# Patient Record
(demographics unavailable — no encounter records)

---

## 2020-04-23 NOTE — RAD
FRONTAL RADIOGRAPH CHEST:

4/23/20

 

COMPARISON: 

4/16/20.

 

HISTORY: 

Shortness of breath, positive COVID test. 

 

FINDINGS: 

Cardiac silhouette is enlarged. There is a stable single lead IACD inserted via left subclavian appro
ach. New interstitial and alveolar opacity noted in the perihilar regions, left greater than right an
d both lung bases. There is a new hazy area of ground glass opacity within the mid right lung zone as
 well. No pneumothorax. 

 

IMPRESSION: 

New perihilar interstitial opacity/ground glass opacity, left greater than right, extending into bila
teral lung bases. These findings are consistent with the patient's history of COVID-19 infection. Pul
monary edema could have a similar appearance in the proper clinical setting. 

 

POS: MARIA E

## 2020-04-24 NOTE — PDOC.HOSPP
- Subjective


Encounter Date: 04/24/20


Subjective: 





Says he feels ok.  Denies problems breathing.  No cough. 





- Objective


Vital Signs & Weight: 


 Vital Signs (12 hours)











  Temp Pulse Resp BP BP Pulse Ox


 


 04/24/20 14:45  96.5 F L  60  16   142/66 H  96


 


 04/24/20 11:47  97.9 F  60  16   144/65 H  98


 


 04/24/20 10:29       93 L


 


 04/24/20 09:15     143/64 H  


 


 04/24/20 09:13   60    


 


 04/24/20 08:00  96.6 F L  60  16   143/64 H  99


 


 04/24/20 04:00  97.7 F  61  22 H   157/70 H  93 L








 Weight











Weight                         139 lb 8 oz














I&O: 


 











 04/23/20 04/24/20 04/25/20





 06:59 06:59 06:59


 


Intake Total  350 


 


Output Total  400 350


 


Balance  -50 -350











Result Diagrams: 


 04/24/20 04:55





 04/24/20 04:55


Additional Labs: 


 Accuchecks











  04/24/20 04/24/20





  11:16 06:42


 


POC Glucose  195 H  174 H














Hospitalist ROS





- Medication


Medications: 


Active Medications











Generic Name Dose Route Start Last Admin





  Trade Name Freq  PRN Reason Stop Dose Admin


 


Acetaminophen  650 mg  04/23/20 22:13  04/24/20 14:28





  Tylenol  PO   650 mg





  Q4H PRN   Administration





  Headache/Fever/Mild Pain (1-3)   





     





     





     


 


Allopurinol  300 mg  04/24/20 09:00  04/24/20 09:14





  Zyloprim  PO   300 mg





  DAILY JEREL   Administration





     





     





     





     


 


Alogliptin Benzoate  25 mg  04/24/20 09:00  04/24/20 09:14





  Alogliptin  PO   25 mg





  DAILY JEREL   Administration





     





     





     





     


 


Aspirin  81 mg  04/24/20 09:00  04/24/20 09:14





  Ecotrin  PO   81 mg





  DAILY JEREL   Administration





     





     





     





     


 


Carvedilol  25 mg  04/24/20 09:00  04/24/20 09:14





  Coreg  PO   25 mg





  BID JEREL   Administration





     





     





     





     


 


Cholecalciferol  1,000 units  04/24/20 09:00  04/24/20 09:14





  Vitamin D3  PO   1,000 units





  DAILY JEREL   Administration





     





     





     





     


 


Digoxin  0.25 mg  04/24/20 09:00  04/24/20 09:13





  Lanoxin  PO   0.25 mg





  DAILY JEREL   Administration





     





     





     





     


 


Ferrous Sulfate  325 mg  04/24/20 09:00  04/24/20 09:15





  Feosol  PO   325 mg





  DAILY JEREL   Administration





     





     





     





     


 


Furosemide  40 mg  04/24/20 14:00  04/24/20 14:06





  Lasix  SLOW IVP   Not Given





  0600,1400 [facility]   





     





     





     





     


 


Ceftriaxone Sodium 1 gm/  100 mls @ 200 mls/hr  04/23/20 23:59  04/24/20 02:25





  Sodium Chloride  IVPB   100 mls





  2359 JEREL   Administration





     





     





     





     


 


Insulin Human Lispro  0 units  04/24/20 02:34  04/24/20 12:06





  Humalog  SC   2 unit





  .MILD SLIDING SCALE PRN   Administration





  Mild Correctional Scale   





     





     





     


 


Lisinopril  20 mg  04/24/20 09:00  04/24/20 09:15





  Zestril  PO   20 mg





  BID JEREL   Administration





     





     





     





     


 


Metformin HCl  1,000 mg  04/24/20 08:00  04/24/20 09:15





  Glucophage  PO   1,000 mg





  QAM-WM JEREL   Administration





     





     





     





     


 


Sodium Chloride  10 ml  04/24/20 09:00  04/24/20 14:08





  Flush - Normal Saline  IVF   10 ml





  Q12HR JEREL   Administration





     





     





     





     














- Exam


General Appearance: NAD, awake alert


Heart: RRR, no murmur, no gallops, no rubs, normal peripheral pulses


Respiratory: no wheezes, no ronchi, normal chest expansion, no tachypnea, 

normal percussion, rales (Minimal scattered)


Gastrointestinal: soft, non-tender, non-distended, normal bowel sounds, no 

palpable masses, no hepatomegaly, no splenomegaly, no bruit


Skin: normal turgor


Neurological: no focal deficits


Musculoskeletal: normal tone


Psychiatric: normal affect, normal behavior, A&O x 3





Hosp A/P


(1) Respiratory failure with hypoxia


Code(s): J96.91 - RESPIRATORY FAILURE, UNSPECIFIED WITH HYPOXIA   Status: Acute

   





(2) Supratherapeutic INR


Code(s): R79.1 - ABNORMAL COAGULATION PROFILE   Status: Acute   





(3) COVID-19


Code(s): U07.1 - COVID-19   Status: Acute   





(4) Community acquired pneumonia


Code(s): J18.9 - PNEUMONIA, UNSPECIFIED ORGANISM   Status: Acute   





(5) Atrial fibrillation


Code(s): I48.91 - UNSPECIFIED ATRIAL FIBRILLATION   Status: Chronic   





(6) Chronic anticoagulation


Code(s): Z79.01 - LONG TERM (CURRENT) USE OF ANTICOAGULANTS   Status: Chronic   





(7) Diabetes type 2, controlled


Code(s): E11.9 - TYPE 2 DIABETES MELLITUS WITHOUT COMPLICATIONS   Status: 

Chronic   





(8) Hypertension


Code(s): I10 - ESSENTIAL (PRIMARY) HYPERTENSION   Status: Chronic   





(9) Cardiomyopathy


Code(s): I42.9 - CARDIOMYOPATHY, UNSPECIFIED   Status: Acute   





(10) Acute on chronic systolic (congestive) heart failure


Code(s): I50.23 - ACUTE ON CHRONIC SYSTOLIC (CONGESTIVE) HEART FAILURE   Status

: Acute   





(11) Debility


Code(s): R53.81 - OTHER MALAISE   Status: Acute   





- Plan





Resp failure:


Asymptomatic now.


Possible Covid, CAP, CHF.


Supplemental oxygen as needed.





Covid:


Did well and was discharged.


Returned feeling weak and SURESH.


May be second phase or may be other dx.


CRP





CAP:


Cover with Rocephin, Doxy.


DC Azithro due to elevated INR on warfarin.





CHF:


Hx of cardiomyopathy with EF 30%.


Elevated BNP.


Oral diuresis.


Continue max dose Coreg and Lisinopril.





Supratx INR:


Did not have it checked after discharge.


? higher since admission due to Azithromycin.


IV Vitamin K. 





Afib:


Good control now.  


Continue Digoxin. 





Gen Debility:


May need some therapy prior to or after discharge. 





HTN:


Primarily systolic.


Continue ACEI, BB.





DM:


Well controlled.

## 2020-04-24 NOTE — EKG
Test Reason : 

Blood Pressure : ***/*** mmHG

Vent. Rate : 061 BPM     Atrial Rate : 064 BPM

   P-R Int : 000 ms          QRS Dur : 204 ms

    QT Int : 428 ms       P-R-T Axes : 000 -71 094 degrees

   QTc Int : 430 ms

 

Electronic ventricular pacemaker

 

Confirmed by MALI KENNY [id number]),  LC MACIAS ([id number]) on 4/24/2020 3:18:59 PM

 

Referred By:             Confirmed By:MALI CABAN

## 2020-04-24 NOTE — PDOC.HHP
Hospitalist HPI





- History of Present Illness


sob


History of Present Illness: 


hisotry is limited most of it was taken from ed notes, patient is poor 

historian does not understand why he is hospital


case of an 78y/o male w pmhx of htn, cad, cva, dm and afib who is covid 19+ who 

comes to hospital brought by son due to increasing respiratory effort. son 

refers that since patient was discharge has been extremely weak having 

difficulty doing activities of daily living that he used to do by himself is 

has become increasingly fatigue and sob for which he was brought to hospital.  





Hospitalist ROS





- Review of Systems


All other systems reviewed; all pertinent +/- noted in HPI/Subj





Hospitalist History





- Past Medical History


Cardiac: reports: CAD, HTN


Pulmonary: reports: CVA/TIA/stroke


Endocrine: reports: Diabetes





- Social History


Alcohol: reports: None


Drugs: reports: none





- Exam


General Appearance: NAD, awake alert


Eye: PERRL, anicteric sclera


ENT: normocephalic atraumatic, no oropharyngeal lesions


Neck: supple, symmetric, no JVD, no thyromegaly


Heart: RRR, no murmur, no gallops, no rubs


Respiratory: CTAB, no wheezes, no rales, no ronchi


Gastrointestinal: soft, non-tender, non-distended, no hepatomegaly


Extremities: no cyanosis, no clubbing


Skin: normal turgor, no lesions, no rashes


Neurological: cranial nerve grossly intact, normal sensation to touch


Musculoskeletal: normal tone, normal strength


Psychiatric: normal affect, normal behavior, A&O x 3





Hospitalist Results





- Labs


Result Diagrams: 


 04/23/20 17:39





 04/23/20 17:39


Lab results: 


 











WBC  13.6 thou/uL (4.8-10.8)  H  04/23/20  17:39    


 


Hgb  12.9 g/dL (14.0-18.0)  L  04/23/20  17:39    


 


Hct  38.1 % (42.0-52.0)  L  04/23/20  17:39    


 


MCV  101.0 fL (78.0-98.0)  H  04/23/20  17:39    


 


Plt Count  232 thou/uL (130-400)   04/23/20  17:39    


 


Neutrophils %  88.7 % (42.0-75.0)  H  04/23/20  17:39    


 


VBG pCO2  29.7 mmHg (40.0-50.0)  L  04/23/20  17:58    


 


VBG pO2  57.0 mmHg (35.0-45.0)  H  04/23/20  17:58    


 


Sodium  142 mmol/L (136-145)   04/23/20  17:39    


 


Potassium  4.5 mmol/L (3.5-5.1)   04/23/20  17:39    


 


Chloride  113 mmol/L ()  H  04/23/20  17:39    


 


Carbon Dioxide  21 mmol/L (23-31)  L  04/23/20  17:39    


 


BUN  40 mg/dL (8.4-25.7)  H  04/23/20  17:39    


 


Creatinine  1.25 mg/dL (0.7-1.3)   04/23/20  17:39    


 


Glucose  232 mg/dL ()  H  04/23/20  17:39    


 


Calcium  8.4 mg/dL (7.8-10.44)   04/23/20  17:39    


 


Total Bilirubin  1.0 mg/dL (0.2-1.2)   04/23/20  17:39    


 


AST  25 U/L (5-34)   04/23/20  17:39    


 


ALT  26 U/L (8-55)   04/23/20  17:39    


 


Alkaline Phosphatase  99 U/L ()   04/23/20  17:39    


 


CK-MB (CK-2)  1.2 ng/mL (0-6.6)   04/23/20  17:39    


 


Troponin I  0.056 ng/mL (< 0.028)  H  04/23/20  23:45    


 


B-Natriuretic Peptide  542.9 pg/mL (0-100)  H  04/23/20  17:39    


 


Serum Total Protein  6.0 g/dL (5.8-8.1)   04/23/20  17:39    


 


Albumin  2.6 g/dL (3.4-4.8)  L  04/23/20  17:39    














- Radiology Interpretation


  ** Chest x-ray


Status: image reviewed by me, report reviewed by me (b/l infiltrates opacieties 

consistent w covid pneumonia)





Hospitalist H&P A/P





- Problem


(1) COVID-19


Code(s): U07.1 - COVID-19   Status: Acute   





(2) Respiratory failure with hypoxia


Code(s): J96.91 - RESPIRATORY FAILURE, UNSPECIFIED WITH HYPOXIA   Status: Acute

   





(3) Supratherapeutic INR


Code(s): R79.1 - ABNORMAL COAGULATION PROFILE   Status: Acute   





(4) Atrial fibrillation


Code(s): I48.91 - UNSPECIFIED ATRIAL FIBRILLATION   Status: Chronic   





(5) Diabetes type 2, controlled


Code(s): E11.9 - TYPE 2 DIABETES MELLITUS WITHOUT COMPLICATIONS   Status: 

Chronic   





(6) Gout


Code(s): M10.9 - GOUT, UNSPECIFIED   Status: Chronic   





(7) Hypertension


Code(s): I10 - ESSENTIAL (PRIMARY) HYPERTENSION   Status: Chronic   





- Plan


Plan: 





covid 19 - pt w positive test, discharge recently comes back w worsening 

symptoms, worsening cxr with new infiltrates consistent w covid 19, does 

present with some leukocytosis this time, will start prophylactic rocephih and 

azithromycin, will admitt to telemetry. will provide 02 supplementation





resp failure hypoxic - 02 supplemeantation, adequate saturation w 4L arrived in 

the 80s





supratherapeutic inr - dx w afib in last hosp, started on warfarin. no signs of 

bleeding, will hold medication and f/u inr





dm - accu check and sliding scale





continue home meds for chronic conditons

## 2020-04-25 NOTE — PDOC.CPN
- Subjective


Date: 04/25/20


Time: 11:22


Interval history: 





The pt seen and examined.  No overnight events. Per , he 

denied any cardiac complaints. 





- Objective


Allergies/Adverse Reactions: 


 Allergies











Allergy/AdvReac Type Severity Reaction Status Date / Time


 


naproxen [From Aleve] Allergy   Verified 03/23/20 12:56











Visit Medications: 


 Current Medications





Acetaminophen (Tylenol)  650 mg PO Q4H PRN


   PRN Reason: Headache/Fever/Mild Pain (1-3)


   Last Admin: 04/24/20 14:28 Dose:  650 mg


Acetaminophen (Tylenol)  650 mg [place] Q4H PRN


   PRN Reason: Headache/Fever/Mild Pain (1-3)


Hydrocodone Bitart/Acetaminophen (Norco 5/325)  1 tab PO Q4H PRN


   PRN Reason: Moderate Pain (4-6)


Hydrocodone Bitart/Acetaminophen (Norco 5/325)  2 tab PO Q4H PRN


   PRN Reason: Severe Pain (7-10)


Allopurinol (Zyloprim)  300 mg PO DAILY [facility]


   Last Admin: 04/25/20 08:53 Dose:  300 mg


Alogliptin Benzoate (Alogliptin)  25 mg PO DAILY [facility]


   Last Admin: 04/25/20 08:53 Dose:  25 mg


Aspirin (Ecotrin)  81 mg PO DAILY [facility]


   Last Admin: 04/25/20 08:53 Dose:  81 mg


Atorvastatin Calcium (Lipitor)  10 mg PO HS [facility]


   Last Admin: 04/24/20 20:45 Dose:  10 mg


Carvedilol (Coreg)  25 mg PO BID [facility]


   Last Admin: 04/25/20 08:53 Dose:  25 mg


Cholecalciferol (Vitamin D3)  1,000 units PO DAILY [facility]


   Last Admin: 04/25/20 08:53 Dose:  1,000 units


Dextrose/Water (Dextrose 50%)  25 gm SLOW IVP PRN PRN


   PRN Reason: Hypoglycemia


Digoxin (Lanoxin)  0.25 mg PO DAILY [facility]


   Last Admin: 04/25/20 08:53 Dose:  0.25 mg


Ferrous Sulfate (Feosol)  325 mg PO DAILY [facility]


   Last Admin: 04/25/20 08:53 Dose:  325 mg


Furosemide (Lasix)  20 mg PO DAILY [facility]


   Last Admin: 04/25/20 08:53 Dose:  20 mg


Glucagon (Glucagon)  1 mg IM PRN PRN


   PRN Reason: Hypoglycemia


Ceftriaxone Sodium 1 gm/ (Sodium Chloride)  100 mls @ 200 mls/hr IVPB 2359 [facility]


   Last Admin: 04/25/20 00:13 Dose:  100 mls


Dextrose/Water (D5w)  1,000 mls @ 0 mls/hr IV PRN PRN


   PRN Reason: Hypoglycemia


Insulin Human Lispro (Humalog)  0 units SC .MILD SLIDING SCALE PRN


   PRN Reason: Mild Correctional Scale


   Last Admin: 04/25/20 06:30 Dose:  2 unit


Lisinopril (Zestril)  20 mg PO BID [facility]


   Last Admin: 04/25/20 08:54 Dose:  20 mg


Metformin HCl (Glucophage)  1,000 mg PO QAM-WM [facility]


   Last Admin: 04/25/20 08:53 Dose:  1,000 mg


Miscellaneous Medication (Pharmacy To Dose)  1 each PO PRN PRN


   PRN Reason: Pharmacy to dose


Nitroglycerin (Nitrostat)  0.4 mg SL Q5MIN PRN


   PRN Reason: Chest Pain


Ondansetron HCl (Zofran Odt)  4 mg PO Q6H PRN


   PRN Reason: Nausea/Vomiting


Ondansetron HCl (Zofran)  4 mg IVP Q6H PRN


   PRN Reason: Nausea/Vomiting


Sodium Chloride (Flush - Normal Saline)  10 ml IVF Q12HR [facility]


   Last Admin: 04/25/20 08:53 Dose:  10 ml


Sodium Chloride (Flush - Normal Saline)  10 ml IVF PRN PRN


   PRN Reason: Saline Flush


Warfarin Sodium (Coumadin)  5 mg PO 1700 [facility]








Vital Signs & Weight: 


 Vital Signs











  Temp Pulse Resp BP Pulse Ox


 


 04/25/20 09:00     143/64 H 


 


 04/25/20 08:53   60   


 


 04/25/20 07:40  97.7 F  60  18  122/60  99


 


 04/25/20 07:09      95


 


 04/25/20 04:15  98.2 F  89  24 H  162/69 H  95


 


 04/25/20 04:00      88 L


 


 04/25/20 00:15  96.9 F L  60  16  145/63 H  96








 











Weight                         139 lb 8 oz

















- Physical Exam


General: alert & oriented x3


HEENT: mucus membranes moist


Neck: supple neck


Cardiac: irregularly regular (V paced)


Lungs: decreased breath sounds


Neuro: cranial nerve 2-12 intact





- Labs


Result Diagrams: 


 04/26/20 04:44





 04/26/20 04:44


 Troponin/CKMB











CK-MB (CK-2)  1.2 ng/mL (0-6.6)   04/23/20  17:39    


 


Troponin I  0.056 ng/mL (< 0.028)  H  04/23/20  23:45    














- Telemetry


Sinus rhythms and dysrhythmias: other (V paced)





- Assessment/Plan


Assessment/Plan: 





1. several episodes of NSVTs - The pt was asymptomatic; waiting for AICD 

interrogation record


2. Ischemic CMY with hx of [facility] AICD - waiting for AICD interrogation record


3. Chronic Systolic HF - Last admission, the pt had Echo according to Dr Alvarado' consult. 


4. COVID 19 positive 


5. Permanent Afib - 


6. JOE - stable


7. DM type 2


8. HLD


MAR reviewed





* Dr Anderson's pt





Pt. seen and nohemi. by me. I agree with the A/P by the NP.  Irreg. rhythm. Chest: 

minimal bibasilar rales. Interrogate AICD. gjgloria

## 2020-04-25 NOTE — PDOC.HOSPP
- Subjective


Encounter Date: 04/25/20


Encounter Time: 08:00


Subjective: 





overnight, multiple episodes of nonsustained vtach, asymptomatic, despite 

receiving beta blocker last night. Otherwise no events. This morning, used 

bedside , patient has no complaints and denies chest pain, shoulder 

pain, jaw pain, back pain, shortness of breath, dizziness. 





- Objective


Vital Signs & Weight: 


 Vital Signs (12 hours)











  Temp Pulse Resp BP BP Pulse Ox


 


 04/25/20 07:40  97.7 F  60  18   122/60  99


 


 04/25/20 07:09       95


 


 04/25/20 04:15  98.2 F  89  24 H   162/69 H  95


 


 04/25/20 04:00       88 L


 


 04/25/20 00:15  96.9 F L  60  16   145/63 H  96


 


 04/24/20 20:45     143/64 H  








 Weight











Weight                         139 lb 8 oz














I&O: 


 











 04/24/20 04/25/20 04/26/20





 06:59 06:59 06:59


 


Intake Total 350 565 


 


Output Total 400 940 


 


Balance -50 -375 











Result Diagrams: 


 04/25/20 04:39





 04/25/20 04:39


Additional Labs: 


 Accuchecks











  04/24/20 04/24/20 04/24/20





  21:19 15:42 11:16


 


POC Glucose  209 H  154 H  195 H














  04/24/20





  06:42


 


POC Glucose  174 H














Hospitalist ROS





- Review of Systems


Constitutional: reports: weakness.  denies: fever, chills, sweats


Respiratory: denies: cough, shortness of breath, hemoptysis, SOB with excertion

, pleuritic pain, sputum


Cardiovascular: denies: chest pain, palpitations, orthopnea, edema, light 

headedness


Gastrointestinal: denies: nausea, vomiting, abdominal pain, diarrhea





- Medication


Medications: 


Active Medications











Generic Name Dose Route Start Last Admin





  Trade Name Freq  PRN Reason Stop Dose Admin


 


Acetaminophen  650 mg  04/23/20 22:13  04/24/20 14:28





  Tylenol  PO   650 mg





  Q4H PRN   Administration





  Headache/Fever/Mild Pain (1-3)   





     





     





     


 


Allopurinol  300 mg  04/24/20 09:00  04/24/20 09:14





  Zyloprim  PO   300 mg





  DAILY JEREL   Administration





     





     





     





     


 


Alogliptin Benzoate  25 mg  04/24/20 09:00  04/24/20 09:14





  Alogliptin  PO   25 mg





  DAILY JEREL   Administration





     





     





     





     


 


Aspirin  81 mg  04/24/20 09:00  04/24/20 09:14





  Ecotrin  PO   81 mg





  DAILY JEREL   Administration





     





     





     





     


 


Atorvastatin Calcium  10 mg  04/24/20 21:00  04/24/20 20:45





  Lipitor  PO   10 mg





  HS JEREL   Administration





     





     





     





     


 


Carvedilol  25 mg  04/24/20 09:00  04/24/20 20:45





  Coreg  PO   25 mg





  BID JEREL   Administration





     





     





     





     


 


Cholecalciferol  1,000 units  04/24/20 09:00  04/24/20 09:14





  Vitamin D3  PO   1,000 units





  DAILY JEREL   Administration





     





     





     





     


 


Digoxin  0.25 mg  04/24/20 09:00  04/24/20 09:13





  Lanoxin  PO   0.25 mg





  DAILY JEREL   Administration





     





     





     





     


 


Ferrous Sulfate  325 mg  04/24/20 09:00  04/24/20 09:15





  Feosol  PO   325 mg





  DAILY JEREL   Administration





     





     





     





     


 


Ceftriaxone Sodium 1 gm/  100 mls @ 200 mls/hr  04/23/20 23:59  04/25/20 00:13





  Sodium Chloride  IVPB   100 mls





  2359 JEREL   Administration





     





     





     





     


 


Insulin Human Lispro  0 units  04/24/20 02:34  04/25/20 06:30





  Humalog  SC   2 unit





  .MILD SLIDING SCALE PRN   Administration





  Mild Correctional Scale   





     





     





     


 


Lisinopril  20 mg  04/24/20 09:00  04/24/20 20:45





  Zestril  PO   20 mg





  BID JEREL   Administration





     





     





     





     


 


Metformin HCl  1,000 mg  04/24/20 08:00  04/24/20 09:15





  Glucophage  PO   1,000 mg





  QAM-WM JEREL   Administration





     





     





     





     


 


Sodium Chloride  10 ml  04/24/20 09:00  04/24/20 20:45





  Flush - Normal Saline  IVF   10 ml





  Q12HR JEREL   Administration





     





     





     





     














- Exam


General Appearance: NAD, awake alert


Neck: no JVD


Heart: no murmur, no gallops


Heart - other findings: paced rhythm with episodes of nonsustained vtach on tele


Respiratory: CTAB, no wheezes, no rales, no ronchi


Extremities: no edema


Psychiatric: normal affect, normal behavior, A&O x 3





Hosp A/P





- Plan





#nonsustained Vtach


-verbal communication with cardiology; will give coreg and continue to observe


-based on CXR, AICD is [facility]; conveyed to nurse, will interrogate





#chronic atrial fibrillation


-no on anticoagulation; subtherapeutic INR





-started warfarin, pharmacy to dose


-continue digoxin





#chronic hypernatremia


#prerenal azotemia


-patient midly hypovolemic on exam





-stop lasix 40mg IVP bid; start 20mg PO daily





#COVID +ve


-reportedly positive in another institute based on ED on previous admission; 

not confirmed to my knowledge and no repeat test sent


-CXR c/w covid pneumonia; currently stable and breathing and saturating well on 

3L NC


-no associated symptoms this morning; afebrile HD 2

## 2020-04-26 NOTE — PDOC.HOSPP
- Subjective


Encounter Date: 04/26/20


Encounter Time: 09:00


Subjective: 





no overnight events. This morning, refuses all oral medications. Will 

transition to IV medications if possible until adheres to oral medications





- Objective


Vital Signs & Weight: 


 Vital Signs (12 hours)











  Temp Pulse Resp BP BP Pulse Ox


 


 04/26/20 21:05     171/74 H  


 


 04/26/20 19:23  96.5 F L  64  22 H   171/74 H  93 L


 


 04/26/20 16:00  97.8 F  77  14   129/62  98


 


 04/26/20 12:44  97.9 F  63  14   177/73 H  98


 


 04/26/20 10:42   62    








 Weight











Weight                         139 lb 8 oz














I&O: 


 











 04/25/20 04/26/20 04/27/20





 06:59 06:59 06:59


 


Intake Total 565 1040 600


 


Output Total 940 500 500


 


Balance -375 540 100











Result Diagrams: 


 04/26/20 04:44





 04/26/20 04:44


Additional Labs: 


 Accuchecks











  04/26/20 04/26/20 04/26/20





  15:53 12:24 05:56


 


POC Glucose  180 H  231 H  196 H














Hospitalist ROS





- Review of Systems


Constitutional: denies: fever, chills, sweats, weakness, malaise, other


Respiratory: denies: cough, dry, shortness of breath, hemoptysis, SOB with 

excertion, pleuritic pain, sputum, wheezing, other


Cardiovascular: denies: chest pain, palpitations, orthopnea, paroxysmal noc. 

dyspnea, edema, light headedness, other


Gastrointestinal: denies: nausea, vomiting, abdominal pain, diarrhea, 

constipation, melena, hematochezia, other





- Medication


Medications: 


Active Medications











Generic Name Dose Route Start Last Admin





  Trade Name Freq  PRN Reason Stop Dose Admin


 


Acetaminophen  650 mg  04/23/20 22:13  04/26/20 08:14





  Tylenol  PO   650 mg





  Q4H PRN   Administration





  Headache/Fever/Mild Pain (1-3)   





     





     





     


 


Allopurinol  300 mg  04/24/20 09:00  04/26/20 10:41





  Zyloprim  PO   Not Given





  DAILY [facility]   





     





     





     





     


 


Alogliptin Benzoate  25 mg  04/24/20 09:00  04/26/20 10:41





  Alogliptin  PO   Not Given





  DAILY [facility]   





     





     





     





     


 


Aspirin  81 mg  04/24/20 09:00  04/26/20 10:41





  Ecotrin  PO   Not Given





  DAILY [facility]   





     





     





     





     


 


Atorvastatin Calcium  10 mg  04/24/20 21:00  04/26/20 21:06





  Lipitor  PO   10 mg





  HS [facility]   Administration





     





     





     





     


 


Carvedilol  25 mg  04/24/20 09:00  04/26/20 21:05





  Coreg  PO   25 mg





  BID [facility]   Administration





     





     





     





     


 


Cholecalciferol  1,000 units  04/24/20 09:00  04/26/20 10:42





  Vitamin D3  PO   Not Given





  DAILY [facility]   





     





     





     





     


 


Digoxin  0.25 mg  04/24/20 09:00  04/26/20 10:42





  Lanoxin  PO   Not Given





  DAILY [facility]   





     





     





     





     


 


Enoxaparin Sodium  60 mg  04/26/20 21:00  04/26/20 21:39





  Lovenox  SC   Not Given





  0900,2100 [facility]   





     





     





     





     


 


Ferrous Sulfate  325 mg  04/24/20 09:00  04/26/20 10:42





  Feosol  PO   Not Given





  DAILY [facility]   





     





     





     





     


 


Ceftriaxone Sodium 1 gm/  100 mls @ 200 mls/hr  04/23/20 23:59  04/25/20 23:47





  Sodium Chloride  IVPB   100 mls





  2359 [facility]   Administration





     





     





     





     


 


Sodium Chloride  1,000 mls @ 50 mls/hr  04/26/20 17:45  04/26/20 18:21





  1/2 Normal Saline  IV   1,000 mls





  .Q20H [facility]   Administration





     





     





     





     


 


Insulin Human Lispro  0 units  04/24/20 02:34  04/25/20 06:30





  Humalog  SC   2 unit





  .MILD SLIDING SCALE PRN   Administration





  Mild Correctional Scale   





     





     





     


 


Lisinopril  20 mg  04/24/20 09:00  04/26/20 21:05





  Zestril  PO   20 mg





  BID [facility]   Administration





     





     





     





     


 


Metformin HCl  1,000 mg  04/24/20 08:00  04/26/20 10:41





  Glucophage  PO   Not Given





  QAM-WM [facility]   





     





     





     





     


 


Sodium Chloride  10 ml  04/24/20 09:00  04/26/20 10:19





  Flush - Normal Saline  IVF   10 ml





  Q12HR [facility]   Administration





     





     





     





     


 


Warfarin Sodium  5 mg  04/26/20 17:00  04/26/20 17:25





  Coumadin  PO   5 mg





  1700 [facility]   Administration





     





     





     





     














- Exam


General Appearance: NAD, awake alert


Heart: no murmur, no gallops, no rubs, irregular


Respiratory: CTAB, no wheezes, no rales, no ronchi


Gastrointestinal: soft, non-tender, non-distended, normal bowel sounds


Extremities: no edema


Psychiatric: normal affect, normal behavior, A&O x 3





Hosp A/P





- Plan





#nonsustained Vtach


-verbal communication with cardiology; will give coreg and continue to observe


-based on CXR, AICD is [facility]; pacemaker interrogated and cardiology aware





#chronic atrial fibrillation


-no on anticoagulation; subtherapeutic INR





-continue warfarin, pharmacy to dose


-continue digoxin


-if refuses warfarin, administer lovenox





#prerenal JOE


-likely due to aggressive diuresis and recent reduced PO intake





hold lasix 20mg PO qd; if renal function worsens may have to hold lisinopril





#COVID +ve


-reportedly positive in another institute based on ED on previous admission; 

not confirmed to my knowledge and no repeat test sent


-CXR c/w covid pneumonia; currently stable and breathing and saturating well on 

RA


-no associated symptoms this morning; afebrile HD 3

## 2020-04-26 NOTE — PDOC.CPN
- Subjective


Date: 04/26/20


Time: 15:24


Interval history: 





The pt seen and examined.  No overnight events.  No cardiac complaints. 





- Objective


Allergies/Adverse Reactions: 


 Allergies











Allergy/AdvReac Type Severity Reaction Status Date / Time


 


naproxen [From Aleve] Allergy   Verified 03/23/20 12:56











Visit Medications: 


 Current Medications





Acetaminophen (Tylenol)  650 mg PO Q4H PRN


   PRN Reason: Headache/Fever/Mild Pain (1-3)


   Last Admin: 04/26/20 08:14 Dose:  650 mg


Acetaminophen (Tylenol)  650 mg NM Q4H PRN


   PRN Reason: Headache/Fever/Mild Pain (1-3)


Hydrocodone Bitart/Acetaminophen (Norco 5/325)  1 tab PO Q4H PRN


   PRN Reason: Moderate Pain (4-6)


Hydrocodone Bitart/Acetaminophen (Norco 5/325)  2 tab PO Q4H PRN


   PRN Reason: Severe Pain (7-10)


Allopurinol (Zyloprim)  300 mg PO DAILY [facility]


   Last Admin: 04/26/20 10:41 Dose:  Not Given


Alogliptin Benzoate (Alogliptin)  25 mg PO DAILY [facility]


   Last Admin: 04/26/20 10:41 Dose:  Not Given


Aspirin (Ecotrin)  81 mg PO DAILY [facility]


   Last Admin: 04/26/20 10:41 Dose:  Not Given


Atorvastatin Calcium (Lipitor)  10 mg PO HS [facility]


   Last Admin: 04/25/20 20:13 Dose:  10 mg


Carvedilol (Coreg)  25 mg PO BID [facility]


   Last Admin: 04/26/20 10:42 Dose:  Not Given


Cholecalciferol (Vitamin D3)  1,000 units PO DAILY [facility]


   Last Admin: 04/26/20 10:42 Dose:  Not Given


Dextrose/Water (Dextrose 50%)  25 gm SLOW IVP PRN PRN


   PRN Reason: Hypoglycemia


Digoxin (Lanoxin)  0.25 mg PO DAILY [facility]


   Last Admin: 04/26/20 10:42 Dose:  Not Given


Enoxaparin Sodium (Lovenox)  60 mg SC 0900,2100 [facility]


Ferrous Sulfate (Feosol)  325 mg PO DAILY [facility]


   Last Admin: 04/26/20 10:42 Dose:  Not Given


Furosemide (Lasix)  20 mg SLOW IVP DAILY [facility]


Glucagon (Glucagon)  1 mg IM PRN PRN


   PRN Reason: Hypoglycemia


Hydralazine HCl (Apresoline)  10 mg SLOW IVP Q6H PRN


   PRN Reason: Hypertension


Ceftriaxone Sodium 1 gm/ (Sodium Chloride)  100 mls @ 200 mls/hr IVPB 2359 [facility]


   Last Admin: 04/25/20 23:47 Dose:  100 mls


Dextrose/Water (D5w)  1,000 mls @ 0 mls/hr IV PRN PRN


   PRN Reason: Hypoglycemia


Insulin Human Lispro (Humalog)  0 units SC .MILD SLIDING SCALE PRN


   PRN Reason: Mild Correctional Scale


   Last Admin: 04/25/20 06:30 Dose:  2 unit


Labetalol HCl (Normodyne)  10 mg SLOW IVP Q4H PRN


   PRN Reason: SBP GREATER THAN 160


Lisinopril (Zestril)  20 mg PO BID [facility]


   Last Admin: 04/26/20 10:42 Dose:  Not Given


Metformin HCl (Glucophage)  1,000 mg PO QAM-WM [facility]


   Last Admin: 04/26/20 10:41 Dose:  Not Given


Miscellaneous Medication (Pharmacy To Dose)  1 each PO PRN PRN


   PRN Reason: Pharmacy to dose


Nitroglycerin (Nitrostat)  0.4 mg SL Q5MIN PRN


   PRN Reason: Chest Pain


Ondansetron HCl (Zofran Odt)  4 mg PO Q6H PRN


   PRN Reason: Nausea/Vomiting


Ondansetron HCl (Zofran)  4 mg IVP Q6H PRN


   PRN Reason: Nausea/Vomiting


Sodium Chloride (Flush - Normal Saline)  10 ml IVF Q12HR [facility]


   Last Admin: 04/26/20 10:19 Dose:  10 ml


Sodium Chloride (Flush - Normal Saline)  10 ml IVF PRN PRN


   PRN Reason: Saline Flush


Warfarin Sodium (Coumadin)  5 mg PO 1700 [facility]








Vital Signs & Weight: 


 Vital Signs











  Temp Pulse Resp BP Pulse Ox


 


 04/26/20 12:44  97.9 F  63  14  177/73 H  98


 


 04/26/20 10:42   62   


 


 04/26/20 08:11  96.7 F L  62  20  139/62  93 L


 


 04/26/20 06:28  96.6 F L  68  20  132/60  98








 











Weight                         139 lb 8 oz

















- Physical Exam


General: alert & oriented x3


HEENT: mucus membranes moist


Neck: supple neck


Cardiac: other ( V paced)





- Labs


Result Diagrams: 


 04/26/20 04:44





 04/26/20 04:44


 Troponin/CKMB











CK-MB (CK-2)  1.2 ng/mL (0-6.6)   04/23/20  17:39    


 


Troponin I  0.056 ng/mL (< 0.028)  H  04/23/20  23:45    














- Telemetry


Sinus rhythms and dysrhythmias: other (V paced)





- Assessment/Plan


Assessment/Plan: 





1. Several episodes of NSVTs - The pt was asymptomatic; AICD interrogation 

showed no VT/VF since VT/VF detection HR limit was > 170; Per [facility] rep, 

needs ordersfrom EP to decrease the setting; will notify to EP ; On coreg; no 

NSVTs since 04/25/2020


2. Ischemic CMY with hx of [facility] AICD - 


3. Chronic Systolic HF - On coreg, Lisinopril 20mg BID, Lasix; Echo was ordered 

during last admission, but have not done since the pt is COVID 19 positive. 


4. COVID 19 positive 


5. Permanent Afib - Well controlled HR with Coreg, Digoxin, and Lovenox BID


6. JOE - stable


7. DM type 2


8. HLD


MAR reviewed





* Dr Anderson's pt as outpt


* Dr Alvarado's pt as inpt

## 2020-04-27 NOTE — PDOC.HOSPP
- Subjective


Encounter Date: 04/27/20


Encounter Time: 09:00


Subjective: 





no overnight events. This morning, more amenable to receiving PO medications. 

has no complaints. Pending placement





- Objective


Vital Signs & Weight: 


 Vital Signs (12 hours)











  Temp Pulse Resp BP BP Pulse Ox


 


 04/27/20 20:20     171/74 H  


 


 04/27/20 16:50  96.9 F L  75  18   163/72 H  98


 


 04/27/20 12:40  97.0 F L  61  18   149/62 H  96


 


 04/27/20 10:24   68    








 Weight











Weight                         139 lb 8 oz














I&O: 


 











 04/26/20 04/27/20 04/28/20





 06:59 06:59 06:59


 


Intake Total 1040 1250 1000


 


Output Total 500 600 200


 


Balance 540 650 800











Result Diagrams: 


 04/26/20 04:44





 04/26/20 04:44


Additional Labs: 


 Accuchecks











  04/27/20 04/27/20 04/26/20





  16:57 05:45 21:14


 


POC Glucose  175 H  174 H  155 H














Hospitalist ROS





- Review of Systems


Constitutional: denies: fever, chills, sweats, weakness, malaise, other


Respiratory: denies: cough, dry, shortness of breath, hemoptysis, SOB with 

excertion, pleuritic pain, sputum, wheezing, other


Cardiovascular: denies: chest pain, palpitations, orthopnea, paroxysmal noc. 

dyspnea, edema, light headedness, other


Gastrointestinal: denies: nausea, vomiting, abdominal pain, diarrhea, 

constipation, melena, hematochezia, other





- Medication


Medications: 


Active Medications











Generic Name Dose Route Start Last Admin





  Trade Name Kareemq  PRN Reason Stop Dose Admin


 


Acetaminophen  650 mg  04/23/20 22:13  04/26/20 08:14





  Tylenol  PO   650 mg





  Q4H PRN   Administration





  Headache/Fever/Mild Pain (1-3)   





     





     





     


 


Allopurinol  300 mg  04/24/20 09:00  04/27/20 10:25





  Zyloprim  PO   Not Given





  DAILY [facility]   





     





     





     





     


 


Alogliptin Benzoate  25 mg  04/24/20 09:00  04/27/20 10:25





  Alogliptin  PO   Not Given





  DAILY [facility]   





     





     





     





     


 


Aspirin  81 mg  04/24/20 09:00  04/27/20 10:24





  Ecotrin  PO   Not Given





  DAILY [facility]   





     





     





     





     


 


Atorvastatin Calcium  10 mg  04/24/20 21:00  04/27/20 20:20





  Lipitor  PO   10 mg





  HS [facility]   Administration





     





     





     





     


 


Carvedilol  25 mg  04/24/20 09:00  04/27/20 20:19





  Coreg  PO   25 mg





  BID [facility]   Administration





     





     





     





     


 


Cholecalciferol  1,000 units  04/24/20 09:00  04/27/20 10:25





  Vitamin D3  PO   Not Given





  DAILY [facility]   





     





     





     





     


 


Digoxin  0.25 mg  04/24/20 09:00  04/27/20 10:24





  Lanoxin  PO   0.25 mg





  DAILY [facility]   Administration





     





     





     





     


 


Digoxin  0.125 mg  04/27/20 09:00  04/27/20 10:54





  Lanoxin  SLOW IVP   Not Given





  DAILY [facility]   





     





     





     





     


 


Enoxaparin Sodium  60 mg  04/26/20 21:00  04/27/20 20:47





  Lovenox  SC   Not Given





  0900,2100 [facility]   





     





     





     





     


 


Ferrous Sulfate  325 mg  04/24/20 09:00  04/27/20 10:25





  Feosol  PO   Not Given





  DAILY [facility]   





     





     





     





     


 


Ceftriaxone Sodium 1 gm/  100 mls @ 200 mls/hr  04/23/20 23:59  04/27/20 20:47





  Sodium Chloride  IVPB   Not Given





  2359 [facility]   





     





     





     





     


 


Insulin Human Lispro  0 units  04/24/20 02:34  04/25/20 06:30





  Humalog  SC   2 unit





  .MILD SLIDING SCALE PRN   Administration





  Mild Correctional Scale   





     





     





     


 


Lisinopril  20 mg  04/24/20 09:00  04/27/20 20:20





  Zestril  PO   20 mg





  BID [facility]   Administration





     





     





     





     


 


Metformin HCl  1,000 mg  04/24/20 08:00  04/27/20 10:25





  Glucophage  PO   Not Given





  QAM-WM [facility]   





     





     





     





     


 


Sodium Chloride  10 ml  04/24/20 09:00  04/27/20 20:46





  Flush - Normal Saline  IVF   Not Given





  Q12HR [facility]   





     





     





     





     


 


Warfarin Sodium  5 mg  04/26/20 17:00  04/27/20 16:37





  Coumadin  PO   5 mg





  1700 [facility]   Administration





     





     





     





     














- Exam


General Appearance: NAD, awake alert


Heart: no murmur, no gallops, no rubs


Heart - other findings: paced rhythm


Respiratory: CTAB, no wheezes, no rales, no ronchi


Gastrointestinal: soft, non-tender, non-distended, normal bowel sounds


Extremities: no edema


Musculoskeletal: normal tone


Musculoskeletal - other findings: deconditioned


Psychiatric: normal behavior, A&O x 3





Hosp A/P





- Plan





#nonsustained Vtach


-per EP, no indication change AICD settings





#chronic atrial fibrillation


-no on anticoagulation; subtherapeutic INR





-continue warfarin, pharmacy to dose





#prerenal JOE


-repeat BMP





hold lasix 20mg PO qd; if renal function worsens may have to hold lisinopril





#COVID +ve


-patient deconditioned and unable to care for self; CM faciliatating placement 

as per son's request

## 2020-04-28 NOTE — PQF
CLINICAL DOCUMENTATION IMPROVEMENT CLARIFICATION FORM:  ICD-10 Updated

PLEASE DO AN ADDENDUM TO THE PROGRESS NOTE WITH ANY DOCUMENTATION UPDATES OR 
ADDITIONS AND CARRY THROUGH TO DC SUMMARY.   THANK YOU.



DATE:  4/28/2020;  4/29/2020                                           ATTN: 
Dr. Vick



Please exercise your independent, professional judgment in responding to the 
clarification form. 

Clinical indicators are provided on the bottom of this form for your review



Please check appropriate box(s):

Conflicting documentation was noted in the Medical Record, please clarify if 
patient is being treated/monitored for:

[  ]  Acute on chronic systolic (congestive) heart failure

[ x ]  Chronic systolic congestive heart failure

[  ]  Other diagnosis ___________

[  ]  Unable to determine



In addition, please specify:

Present on Admission (POA):  [ x ] Yes             [  ] No             [  ] 
Unable to determine



For continuity of documentation, please document condition throughout progress 
notes and discharge summary.  Thank You.



CLINICAL INDICATORS - SIGNS / SYMPTOMS/ LABS    / RESULTS AND LOCATION IN EMR



H&P 4/23: B-Natriuretic Peptide   542.9



4/24 ([facility]) A/P:  Acute on chronic systolic (congestive) heart failure

                   Plan:   CHF: 

                              Hx of cardiomyopathy with EF 30%



4/26 ([facility]) Chronic Systolic HF 



4/27([facility]) Chronic systolic congestive heart failure, nonischemic cardiomyopathy

                    



RISKS:

H&P 4/23: 78 yo male w pmhx of htn, cad, cva, dm and afib who is covid 19 
positive.

4/26 ([facility]) Ischemic CMY with hx of [facility] AICD. Chronic Systolic HF - Last 
admission





TREATMENT:

4/25 ([facility]) -stop lasix 40 mg IVP bid: start 20 mg PO daily

4/26 ([facility]) On coreg, Lisinopril 20mg BID, Lasix

MAR Order 4/26: Lasix 20 mg slow IVP daily

_____________________________________________________________



Thank you,

Karen

(This form is maintained as a part of the permanent medical record)

2015 [employer]  All Rights Reserved

Karen Varela, RN, BSN    [email or web address]    Cell Phone: [phone]

                                                              

 

[facility]

## 2020-04-28 NOTE — CON
DATE OF CONSULTATION:  04/27/2020



Dictated by Brenda Luis, nurse practitioner, acting as scribe for Dr. Hector Archuleta. 



PHYSICIAN CONSULTED:  Hector Archuleta MD



REASON FOR CONSULTATION:  Nonsustained ventricular tachycardia.



HISTORY OF PRESENT ILLNESS:  Mr. Stoner is a 79-year-old gentleman with a

complicated past medical history including coronary artery disease, atrial

fibrillation, ICD in-situ, and hypertension.  He regularly follows with a

cardiologist in [place], Tadeo Anderson.  He was diagnosed with COVID

earlier this month and he was brought to the emergency room by his son due to

increased respiratory effort.  The patient had recently been discharged, but he is a

very poor historian and does not have a good grasp of why he was at the hospital.

Since being admitted, he has been seen to have nonsustained ventricular tachycardia

on the telemetry monitoring system at approximately 120 to 130 beats per minute.  He

has been asymptomatic with these episodes.  His ICD was interrogated and VT is

falling below the detect rate.  EP consultation was requested for guidance on VT and

ICD programming. 



Mr. Stoner does not voice any significant acute issues.  He does have persisting

weakness along side with fatigue and shortness of breath. 



REVIEW OF SYSTEMS:  A 12-point review of systems is otherwise unremarkable.  The

patient denies any heart racing, palpitations, chest pain, pressure, syncope, near

syncope, stroke, or stroke-like symptoms. 



PAST MEDICAL HISTORY:  Coronary artery disease, hypertension, stroke, diabetes,

atrial fibrillation, and [facility] [facility] [employer] ICD implanted February 2012. 



SOCIAL HISTORY:  Denies alcohol, tobacco, or illicit drug use.



FAMILY HISTORY:  Noncontributory.  The patient is a poor historian.



ALLERGIES:  NAPROXEN.



HOME MEDICATIONS:  Include;

1. Short course of Omnicef.

2. Allopurinol 300 mg daily.

3. Januvia 100 mg daily.

4. Metformin 1000 mg q.a.m.

5. Warfarin as directed.

6. Vitamin D3 of 1000 units daily.

7. Coreg 25 mg b.i.d.

8. Aspirin 81 mg daily.

9. Simvastatin 20 mg daily.

10. Potassium 10 mEq daily.

11. Omeprazole 20 mg daily.

12. Nitroglycerin sublingual as needed.

13. Magnesium glycinate 400 mg p.o. b.i.d.

14. Lasix 20 mg q.a.m.

15. Digoxin 250 mcg daily.

16. Cardizem SR 90 mg b.i.d.

17. Iron 325 mg daily.

18. Lisinopril 20 mg b.i.d.



OBJECTIVE:  VITAL SIGNS:  Temperature 97 degrees, pulse 68, blood pressure 132/65,

respirations 24, and oxygen 94% on room air. 

GENERAL:  The patient is alert and oriented to person, place. Poor historian, poorly

aware of his current situation, but in no apparent distress at the time of exam. 

NECK:  Supple with no jugular venous distention.  There is no lymphadenopathy.  His

trachea is midline. 

LUNGS:  Diminished in the bases with fine bibasilar crackles.  Otherwise,

respirations in the upper lobes are clear.  The patient is in no respiratory

distress. 

HEART:  Rate is irregularly irregular.  No significant murmur, rub, or gallop is

appreciated.  Precordial ICD site is without reaction. 

ABDOMEN:  Soft and nontender without palpable masses.  Positive bowel sounds are

noted throughout. 

EXTREMITIES:  Warm and dry to touch.  Well perfused.  Trace bilateral lower

extremity edema.  No clubbing or cyanosis. 

NEUROLOGIC:  Grossly intact and at baseline.  Gait was not assessed.



LABORATORY DATA:  WBC 11.1, trending down and hemoglobin 12.3.  Chemistries are

unremarkable.  Creatinine 1.38 and magnesium 1.9.  Digoxin levels 1.45. 



DIAGNOSTIC STUDIES:  Telemetry and EKG show atrial fibrillation with RV pacing,

nonsustained VT at approximately 120 beats per minute. 



IMPRESSION:  

1. Slow nonsustained ventricular tachycardia below the implantable

cardioverter-defibrillator detect rate and largely asymptomatic and unaware of these

episodes. 

2. Ejection fraction 30% by echo in March of 2017.

3. Chronic systolic congestive heart failure, nonischemic cardiomyopathy.

4. Chronic atrial fibrillation.

5. Status post single-chamber implantable cardioverter-defibrillator implant in

February 2012 with chronically elevated RV pacing at 88%. 

6. Recent positive COVID with pneumonia improving.

7. Elevated INR with recent dose adjustments.



RECOMMENDATIONS:  For now, we will continue monitoring via his ICD and on telemetry.

 He is asymptomatic with his nonsustained slow VT episodes.  I would hold off on

antiarrhythmic therapy options at this time.  He is in generally frail condition, so

I would recommend routine medical management with continued Coreg.  Seeing as he is

asymptomatic with these episodes and they are nonsustained, I do not see much

benefit in identifying them with ICD.  If he does begin to have a more rapid or

sustained VT, his ICD will appropriately detect that and the device is functioning

normally. 



Thank you for allowing me to assist with the care of this patient.  I do recommend

that he follow up as an outpatient with his cardiologist. 







Job ID:  [id number]

## 2020-04-28 NOTE — PDOC.HOSPP
- Subjective


Encounter Date: 04/28/20


Encounter Time: 10:00


Subjective: 





no overnight events. this morning, feeling well and has no complaints. pending 

placement





- Objective


Vital Signs & Weight: 


 Vital Signs (12 hours)











  Temp Pulse Resp BP BP Pulse Ox


 


 04/28/20 15:53       97


 


 04/28/20 15:52  97.6 F  60  18   132/63  97


 


 04/28/20 09:00  97.4 F L  68  20   126/75  93 L


 


 04/28/20 08:43   60    


 


 04/28/20 08:42     171/74 H  


 


 04/28/20 08:40   60    








 Weight











Weight                         139 lb 8 oz














I&O: 


 











 04/27/20 04/28/20 04/29/20





 06:59 06:59 06:59


 


Intake Total 1250 1240 420


 


Output Total 600 900 425


 


Balance 650 340 -5











Result Diagrams: 


 04/28/20 04:24





 04/28/20 04:24


Additional Labs: 


 Accuchecks











  04/28/20 04/28/20 04/27/20





  15:17 12:11 20:27


 


POC Glucose  192 H  232 H  184 H














Hospitalist ROS





- Review of Systems


Constitutional: denies: fever, chills, sweats, weakness, malaise, other


Respiratory: denies: cough, dry, shortness of breath, hemoptysis, SOB with 

excertion, pleuritic pain, sputum, wheezing, other


Cardiovascular: denies: chest pain, palpitations, orthopnea, paroxysmal noc. 

dyspnea, edema, light headedness, other


Gastrointestinal: denies: nausea, vomiting, abdominal pain, diarrhea, 

constipation, melena, hematochezia, other





- Medication


Medications: 


Active Medications











Generic Name Dose Route Start Last Admin





  Trade Name Freq  PRN Reason Stop Dose Admin


 


Acetaminophen  650 mg  04/23/20 22:13  04/26/20 08:14





  Tylenol  PO   650 mg





  Q4H PRN   Administration





  Headache/Fever/Mild Pain (1-3)   





     





     





     


 


Allopurinol  300 mg  04/24/20 09:00  04/28/20 08:41





  Zyloprim  PO   300 mg





  DAILY JEREL   Administration





     





     





     





     


 


Alogliptin Benzoate  25 mg  04/24/20 09:00  04/28/20 08:41





  Alogliptin  PO   25 mg





  DAILY [facility]   Administration





     





     





     





     


 


Aspirin  81 mg  04/24/20 09:00  04/28/20 08:41





  Ecotrin  PO   81 mg





  DAILY [facility]   Administration





     





     





     





     


 


Atorvastatin Calcium  10 mg  04/24/20 21:00  04/28/20 20:28





  Lipitor  PO   10 mg





  HS [facility]   Administration





     





     





     





     


 


Carvedilol  25 mg  04/24/20 09:00  04/28/20 20:28





  Coreg  PO   25 mg





  BID [facility]   Administration





     





     





     





     


 


Cholecalciferol  1,000 units  04/24/20 09:00  04/28/20 08:41





  Vitamin D3  PO   1,000 units





  DAILY [facility]   Administration





     





     





     





     


 


Digoxin  0.25 mg  04/24/20 09:00  04/28/20 08:40





  Lanoxin  PO   0.25 mg





  DAILY [facility]   Administration





     





     





     





     


 


Digoxin  0.125 mg  04/27/20 09:00  04/28/20 08:43





  Lanoxin  SLOW IVP   Not Given





  DAILY [facility]   





     





     





     





     


 


Enoxaparin Sodium  60 mg  04/26/20 21:00  04/28/20 20:28





  Lovenox  SC   60 mg





  0900,2100 [facility]   Administration





     





     





     





     


 


Ferrous Sulfate  325 mg  04/24/20 09:00  04/28/20 08:41





  Feosol  PO   325 mg





  DAILY [facility]   Administration





     





     





     





     


 


Furosemide  20 mg  04/27/20 09:00  04/28/20 08:42





  Lasix  SLOW IVP   20 mg





  DAILY [facility]   Administration





     





     





     





     


 


Ceftriaxone Sodium 1 gm/  100 mls @ 200 mls/hr  04/23/20 23:59  04/27/20 20:47





  Sodium Chloride  IVPB   Not Given





  2359 [facility]   





     





     





     





     


 


Insulin Human Lispro  0 units  04/24/20 02:34  04/28/20 16:46





  Humalog  SC   2 unit





  .MILD SLIDING SCALE PRN   Administration





  Mild Correctional Scale   





     





     





     


 


Lisinopril  20 mg  04/24/20 09:00  04/28/20 20:27





  Zestril  PO   20 mg





  BID [facility]   Administration





     





     





     





     


 


Metformin HCl  1,000 mg  04/24/20 08:00  04/28/20 08:41





  Glucophage  PO   1,000 mg





  QAM-WM [facility]   Administration





     





     





     





     


 


Sodium Chloride  10 ml  04/24/20 09:00  04/28/20 20:28





  Flush - Normal Saline  IVF   Not Given





  Q12HR [facility]   





     





     





     





     


 


Warfarin Sodium  5 mg  04/26/20 17:00  04/28/20 16:46





  Coumadin  PO   5 mg





  1700 [facility]   Administration





     





     





     





     














- Exam


General Appearance: NAD, awake alert


Heart: RRR, no murmur, no gallops, no rubs, normal peripheral pulses


Respiratory: CTAB, no wheezes, no rales, no ronchi, normal chest expansion, no 

tachypnea, normal percussion


Gastrointestinal: soft, non-tender, non-distended, normal bowel sounds, no 

palpable masses, no hepatomegaly, no splenomegaly, no bruit


Extremities: no edema





Hosp A/P





- Plan





#nonsustained Vtach


-per EP, no indication to change AICD settings





#chronic atrial fibrillation


-nearly at goal





-continue warfarin, low vitamin-k diet, pharmacy to dose





#prerenal JOE


-resolved after supplementing fluids





hold lasix 20mg PO qd





#COVID +ve


-patient deconditioned and unable to care for self; CM faciliatating placement 

as per son's request


-covid +ve (4/28); pending placement in [facility] swing, will accept despite +ve 

covid





expected DC 4/29

## 2020-04-28 NOTE — PDOC.EP
- Subjective


Date: 04/28/20


Time: 08:00


Interval History: 





follow up for ICD and NSVT management. Patient feels fair.  Pending placement. 

Voices no complaints. 





- Review of Systems


Constitutional: denies: chills, fever, malaise, sweats, weakness, other


Respiratory: denies: cough, dry, hemoptysis, pleuritic pain, shortness of breath

, SOB with excertion, sputum, wheezing, other


Cardiology: denies: chest pain, light headedness, passing out





- Objective


Allergies/Adverse Reactions: 


 Allergies











Allergy/AdvReac Type Severity Reaction Status Date / Time


 


naproxen [From Aleve] Allergy   Verified 03/23/20 12:56











 Current Medications





Acetaminophen (Tylenol)  650 mg PO Q4H PRN


   PRN Reason: Headache/Fever/Mild Pain (1-3)


   Last Admin: 04/26/20 08:14 Dose:  650 mg


Acetaminophen (Tylenol)  650 mg OK Q4H PRN


   PRN Reason: Headache/Fever/Mild Pain (1-3)


Hydrocodone Bitart/Acetaminophen (Norco 5/325)  1 tab PO Q4H PRN


   PRN Reason: Moderate Pain (4-6)


Hydrocodone Bitart/Acetaminophen (Norco 5/325)  2 tab PO Q4H PRN


   PRN Reason: Severe Pain (7-10)


Allopurinol (Zyloprim)  300 mg PO DAILY [facility]


   Last Admin: 04/28/20 08:41 Dose:  300 mg


Alogliptin Benzoate (Alogliptin)  25 mg PO DAILY [facility]


   Last Admin: 04/28/20 08:41 Dose:  25 mg


Aspirin (Ecotrin)  81 mg PO DAILY [facility]


   Last Admin: 04/28/20 08:41 Dose:  81 mg


Atorvastatin Calcium (Lipitor)  10 mg PO HS [facility]


   Last Admin: 04/27/20 20:20 Dose:  10 mg


Carvedilol (Coreg)  25 mg PO BID [facility]


   Last Admin: 04/28/20 08:41 Dose:  25 mg


Cholecalciferol (Vitamin D3)  1,000 units PO DAILY [facility]


   Last Admin: 04/28/20 08:41 Dose:  1,000 units


Dextrose/Water (Dextrose 50%)  25 gm SLOW IVP PRN PRN


   PRN Reason: Hypoglycemia


Digoxin (Lanoxin)  0.25 mg PO DAILY [facility]


   Last Admin: 04/28/20 08:40 Dose:  0.25 mg


Digoxin (Lanoxin)  0.125 mg SLOW IVP DAILY [facility]


   Last Admin: 04/28/20 08:43 Dose:  Not Given


Enoxaparin Sodium (Lovenox)  60 mg SC 0900,2100 [facility]


   Last Admin: 04/28/20 08:42 Dose:  60 mg


Ferrous Sulfate (Feosol)  325 mg PO DAILY [facility]


   Last Admin: 04/28/20 08:41 Dose:  325 mg


Furosemide (Lasix)  20 mg SLOW IVP DAILY [facility]


   Last Admin: 04/28/20 08:42 Dose:  20 mg


Glucagon (Glucagon)  1 mg IM PRN PRN


   PRN Reason: Hypoglycemia


Hydralazine HCl (Apresoline)  10 mg SLOW IVP Q6H PRN


   PRN Reason: Hypertension BP>180/110


Ceftriaxone Sodium 1 gm/ (Sodium Chloride)  100 mls @ 200 mls/hr IVPB 2359 [facility]


   Last Admin: 04/27/20 20:47 Dose:  Not Given


Dextrose/Water (D5w)  1,000 mls @ 0 mls/hr IV PRN PRN


   PRN Reason: Hypoglycemia


Insulin Human Lispro (Humalog)  0 units SC .MILD SLIDING SCALE PRN


   PRN Reason: Mild Correctional Scale


   Last Admin: 04/25/20 06:30 Dose:  2 unit


Labetalol HCl (Normodyne)  10 mg SLOW IVP Q4H PRN


   PRN Reason: SBP GREATER THAN 160


Lisinopril (Zestril)  20 mg PO BID [facility]


   Last Admin: 04/28/20 08:42 Dose:  20 mg


Metformin HCl (Glucophage)  1,000 mg PO QAM-WM [facility]


   Last Admin: 04/28/20 08:41 Dose:  1,000 mg


Miscellaneous Medication (Pharmacy To Dose)  1 each PO PRN PRN


   PRN Reason: Pharmacy to dose


Nitroglycerin (Nitrostat)  0.4 mg SL Q5MIN PRN


   PRN Reason: Chest Pain


Ondansetron HCl (Zofran Odt)  4 mg PO Q6H PRN


   PRN Reason: Nausea/Vomiting


Ondansetron HCl (Zofran)  4 mg IVP Q6H PRN


   PRN Reason: Nausea/Vomiting


Sodium Chloride (Flush - Normal Saline)  10 ml IVF Q12HR [facility]


   Last Admin: 04/28/20 08:43 Dose:  10 ml


Sodium Chloride (Flush - Normal Saline)  10 ml IVF PRN PRN


   PRN Reason: Saline Flush


Warfarin Sodium (Coumadin)  5 mg PO 1700 [facility]


   Last Admin: 04/27/20 16:37 Dose:  5 mg








Vital Signs & Weight: 


 Vital Signs











  Temp Pulse Resp BP BP Pulse Ox


 


 04/28/20 08:43   60    


 


 04/28/20 08:42     171/74 H  


 


 04/28/20 08:40   60    


 


 04/28/20 04:12  97.4 F L  60  16   147/64 H  95


 


 04/28/20 00:24       95








 











Weight                         139 lb 8 oz














I/O: 


 I/O











 04/27/20 04/28/20 04/29/20





 06:59 06:59 06:59


 


Intake Total 1250 1240 


 


Output Total 600 900 


 


Balance 650 340 














- Quality Measures


Condition: Atrial Fibrillation/Flutter (hx or current)


CV meds: Warfarin: Yes





- Physical Exam


General: appears well, no apparent distress, speech clear, affect appropriate


HEENT: mucus membranes moist, normocephaly


Neck: supple neck, no JVD/HJR, no lymphadenopathy


Cardiology: no murmur, regular rate, PMI nondisplaced, irregularly irregular


Lungs: clear to auscultation, no wheeze, rales, rhonchi


Neurology: cranial nerve 2-12 intact, grossly intact, no lateralizing findings


Skin: device site stable w/o swelling





- Labs


Result Diagrams: 


 04/28/20 04:24





 04/28/20 04:24





- EKG Interpretation


EKG Method: Telemetry


EKG shows: Atrial fibrillation





- Device


Device: single, defibrillator


Device Result: [facility]





- Assessment/Plan


Assessment/Plan: 





1. Slow nonsustained ventricular tachycardia below the implantable


cardioverter-defibrillator detect rate and largely asymptomatic and unaware of 

these


episodes. 


2. Ejection fraction 30% by echo in March of 2017.


3. Chronic systolic congestive heart failure, nonischemic cardiomyopathy.


4. Chronic atrial fibrillation.


5. Status post single-chamber implantable cardioverter-defibrillator implant in


February 2012 with chronically elevated RV pacing at 88%. 


6. Recent positive COVID with pneumonia improving.


7. Elevated INR with recent dose adjustments.








Continues to have slow NSVT, ~100bpm, of which Mr Stoner in asymptomatic and 

unaware.Continue beta blocker therapy. He has high grade RV pacing but this is 

reported on his device for years with no declining EF. No changes from EP plan.

## 2020-04-29 NOTE — PQF
CLINICAL DOCUMENTATION IMPROVEMENT CLARIFICATION FORM:  ICD-10 Updated

PLEASE DO AN ADDENDUM TO THE PROGRESS NOTE WITH ANY DOCUMENTATION UPDATES OR 
ADDITIONS AND CARRY THROUGH TO DC SUMMARY.   THANK YOU.



DATE:      4/29/2020                                   ATTN:  Dr. Vick



Please exercise your independent, professional judgment in responding to the 
clarification form. 

Clinical indicators are provided on the bottom of this form for your review



Please check appropriate box(es):

[  ] Sepsis present on admission    [  ] Sepsis NOT present on admission    [  
] Unable to determine

             Due to: _______________

[  ] Severe sepsis present on admission     [  ] Severe Sepsis NOT present on 
admission   [  ] Unable to determine

                    with acute organ dysfunction of: ___________________

[ x ] Localized infection without sepsis

[  ] Other diagnosis ___________

[  ] Unable to determine



For continuity of documentation, please document condition throughout progress 
notes and discharge summary.  Thank You.



CLINICAL INDICATORS - SIGNS / SYMPTOMS / LABS   / RESULTS AND LOCATION IN 



ED Record 4/23  VS: /53, Pulse 61, Resp. 39, Temp. 97.4.  O2 sat 96 on 4L 
Oxygen



H&P 4/23: Lab: WBC 13.6

                       Neutrophils   88.7 %

                       COVID-19

                       Respiratory failure with hypoxia 



LAB: 4/25  CRP  11.82



RISK:

H&P 4/23: 78 yo male w pmhx of htn, cad, cva, dm and afib who is covid 19 
positive.

COVID-19. Respiratory failure with hypoxia.

4/27 (Nava) prerenal JOE





TREATMENT:

H&P 4/23: will start prophylactic rocephin and azithromycin. 

Order 4/23: Resp: O2 to keep sats 94% continuous

___________________________________________________________





Thank you,

Karen

(This form is maintained as a part of the permanent medical record)

 2015 [email or web address].  All Rights Reserved

Karen Varela RN, BSN    [email or web address]    Cell Phone: [phone]

                                                              

[facility]

## 2020-04-29 NOTE — PDOC.EP
- Subjective


Date: 04/29/20


Time: 13:09


Interval History: 





follow up for slow NSVT and ICD management. 





- Review of Systems


ROS unobtainable: due to mental status





- Objective


Allergies/Adverse Reactions: 


 Allergies











Allergy/AdvReac Type Severity Reaction Status Date / Time


 


naproxen [From Aleve] Allergy   Verified 03/23/20 12:56











 Current Medications





Acetaminophen (Tylenol)  650 mg PO Q4H PRN


   PRN Reason: Headache/Fever/Mild Pain (1-3)


   Last Admin: 04/26/20 08:14 Dose:  650 mg


Acetaminophen (Tylenol)  650 mg [place] Q4H PRN


   PRN Reason: Headache/Fever/Mild Pain (1-3)


Hydrocodone Bitart/Acetaminophen (Norco 5/325)  1 tab PO Q4H PRN


   PRN Reason: Moderate Pain (4-6)


Hydrocodone Bitart/Acetaminophen (Norco 5/325)  2 tab PO Q4H PRN


   PRN Reason: Severe Pain (7-10)


Allopurinol (Zyloprim)  300 mg PO DAILY [facility]


   Last Admin: 04/29/20 08:07 Dose:  300 mg


Alogliptin Benzoate (Alogliptin)  25 mg PO DAILY [facility]


   Last Admin: 04/29/20 08:36 Dose:  25 mg


Aspirin (Ecotrin)  81 mg PO DAILY [facility]


   Last Admin: 04/29/20 08:07 Dose:  81 mg


Atorvastatin Calcium (Lipitor)  10 mg PO HS [facility]


   Last Admin: 04/28/20 20:28 Dose:  10 mg


Carvedilol (Coreg)  25 mg PO BID [facility]


   Last Admin: 04/29/20 08:07 Dose:  25 mg


Cholecalciferol (Vitamin D3)  1,000 units PO DAILY [facility]


   Last Admin: 04/29/20 08:07 Dose:  1,000 units


Dextrose/Water (Dextrose 50%)  25 gm SLOW IVP PRN PRN


   PRN Reason: Hypoglycemia


Digoxin (Lanoxin)  0.25 mg PO DAILY [facility]


   Last Admin: 04/29/20 08:33 Dose:  0.25 mg


Digoxin (Lanoxin)  0.125 mg SLOW IVP DAILY [facility]


   Last Admin: 04/29/20 08:34 Dose:  Not Given


Enoxaparin Sodium (Lovenox)  60 mg SC 0900,2100 [facility]


   Last Admin: 04/29/20 08:07 Dose:  60 mg


Ferrous Sulfate (Feosol)  325 mg PO DAILY [facility]


   Last Admin: 04/29/20 08:07 Dose:  325 mg


Furosemide (Lasix)  20 mg SLOW IVP DAILY [facility]


   Last Admin: 04/29/20 08:08 Dose:  20 mg


Glucagon (Glucagon)  1 mg IM PRN PRN


   PRN Reason: Hypoglycemia


Hydralazine HCl (Apresoline)  10 mg SLOW IVP Q6H PRN


   PRN Reason: Hypertension BP>180/110


Ceftriaxone Sodium 1 gm/ (Sodium Chloride)  100 mls @ 200 mls/hr IVPB 2359 [facility]


   Last Admin: 04/28/20 21:36 Dose:  Not Given


Dextrose/Water (D5w)  1,000 mls @ 0 mls/hr IV PRN PRN


   PRN Reason: Hypoglycemia


Insulin Human Lispro (Humalog)  0 units SC .MILD SLIDING SCALE PRN


   PRN Reason: Mild Correctional Scale


   Last Admin: 04/29/20 12:00 Dose:  2 unit


Labetalol HCl (Normodyne)  10 mg SLOW IVP Q4H PRN


   PRN Reason: SBP GREATER THAN 160


Lisinopril (Zestril)  20 mg PO BID [facility]


   Last Admin: 04/29/20 08:35 Dose:  Not Given


Metformin HCl (Glucophage)  1,000 mg PO QAM-WM [facility]


   Last Admin: 04/29/20 08:06 Dose:  1,000 mg


Miscellaneous Medication (Pharmacy To Dose)  1 each PO PRN PRN


   PRN Reason: Pharmacy to dose


Nitroglycerin (Nitrostat)  0.4 mg SL Q5MIN PRN


   PRN Reason: Chest Pain


Ondansetron HCl (Zofran Odt)  4 mg PO Q6H PRN


   PRN Reason: Nausea/Vomiting


Ondansetron HCl (Zofran)  4 mg IVP Q6H PRN


   PRN Reason: Nausea/Vomiting


Sodium Chloride (Flush - Normal Saline)  10 ml IVF Q12HR [facility]


   Last Admin: 04/29/20 08:35 Dose:  Not Given


Sodium Chloride (Flush - Normal Saline)  10 ml IVF PRN PRN


   PRN Reason: Saline Flush


Warfarin Sodium (Coumadin)  5 mg PO 1700 [facility]


   Last Admin: 04/28/20 16:46 Dose:  5 mg








Vital Signs & Weight: 


 Vital Signs











  Temp Pulse Resp BP BP Pulse Ox


 


 04/29/20 12:00  97.8 F  62  16   135/90  92 L


 


 04/29/20 08:35     135/58 L  


 


 04/29/20 08:34   60    


 


 04/29/20 08:33   60    


 


 04/29/20 08:15  98.5 F  60  16   135/58 L 


 


 04/29/20 08:00       95


 


 04/29/20 03:18  97.6 F  60  18   130/63  95








 











Weight                         139 lb 8 oz














I/O: 


 I/O











 04/28/20 04/29/20 04/30/20





 06:59 06:59 06:59


 


Intake Total 1240 660 


 


Output Total 900 425 


 


Balance 340 235 














- Quality Measures


Condition: Atrial Fibrillation/Flutter (hx or current)


CV meds: Warfarin: Yes





- Physical Exam


General: no apparent distress.  negative: alert & oriented x3


HEENT: mucus membranes moist, normocephaly.  negative: oral lesions


Neck: supple neck, no JVD/HJR, no lymphadenopathy


Cardiology: regular rate and rhythm, PMI nondisplaced


Lungs: clear to auscultation, no wheeze, rales, rhonchi, decreased breath sounds





- Labs


Result Diagrams: 


 04/28/20 04:24





 04/28/20 04:24





- EKG Interpretation


EKG Method: Telemetry


EKG shows: Sinus rhythm





- Assessment/Plan


Assessment/Plan: 








1. Slow nonsustained ventricular tachycardia below the implantable


cardioverter-defibrillator detect rate and largely asymptomatic and unaware of 

these


episodes. 


2. Ejection fraction 30% by echo in March of 2017.


3. Chronic systolic congestive heart failure, nonischemic cardiomyopathy.


4. Chronic atrial fibrillation.


5. Status post single-chamber implantable cardioverter-defibrillator implant in


February 2012 with chronically elevated RV pacing at 88%. 


6. Recent positive COVID with pneumonia improving.


7. Elevated INR with recent dose adjustments.








Had a single 5 beat run of NSVT, ~100bpm, of which he is asymptomatic and 

unaware. Continue beta blocker therapy. He has high grade RV pacing but this is 

reported on his device for years with no declining EF. No changes from EP plan.

  OK for DC. Signing off

## 2020-04-30 NOTE — DIS
DATE OF ADMISSION:  04/23/2020



DATE OF DISCHARGE:  04/29/2020



HOSPITAL COURSE:  Mr. Stoner is a 79-year-old male who presented with dyspnea 
after

being diagnosed with COVID pneumonia.  The patient was discharged recently, but 
has

had progressively worsening weakness and increased respiratory effort, so the 
son

brought him back to the ED and he was admitted for treatment of COVID.  During 
his

inpatient stay, he was also treated for prerenal JOE that has resolved chronic

atrial fibrillation, which required adjustment of his warfarin dosage and

nonsustained ventricular tachycardia for which Cardiology was consulted, AICD 
was

interrogated, and there was no indication per EP Cardiology to change AICD

settings. 



PHYSICAL EXAMINATION:

VITAL SIGNS:  Blood pressure 145/66, pulse 64, respiratory rate 14, oxygen

saturation 92% on room air, and temperature 98.9. 

GENERAL APPEARANCE:  No apparent distress.  Alert and oriented. 

HEART:  Regular rate and rhythm.  No murmur.  No gallops.  No rubs.  Normal

peripheral pulses. 

RESPIRATORY:  Clear to auscultation bilaterally.  No wheezes, no rales, no 
rhonchi.

Normal chest expansion.  No tachypnea. 

GI:  Soft, nontender, nondistended.  Normal bowel sounds. 

EXTREMITIES:  No edema. 



The patient was discharged to a swing bed at [place].



DISCHARGE MEDICATIONS:  New medications:

1. Atorvastatin 40 mg daily. 

Continued medications:

1. Allopurinol.

2. Digoxin.

3. Ferrous sulfate.

4. Omeprazole.

5. Metformin.

6. Lasix.

7. Sitagliptin.

8. Lisinopril.

9. Carvedilol.

10. Warfarin.

11. Magnesium oxide.

12. Cholecalciferol.

13. Aspirin.

14. Nitroglycerin. 

Discontinued medications:

1. Simvastatin.

2. Cefdinir.

3. Diltiazem.







Job ID:  [id number]



MTDD

## 2020-05-01 NOTE — PQF
LUIS ROMO PAMELA AKERS JESUS

[id number]                                                             [facility]

[id number]                             

                                   

CLINICAL DOCUMENTATION CLARIFICATION FORM:  POST DISCHARGE



Addendum to original discharge summary date:  __________________________________
____



Late entry note date:  _________________________________________________________
__







DATE:  05/01/2020                                                              
             ATTN:JESUS AKERS



Please exercise your independent, professional judgment in responding to the 
clarification form. 

Clinical indicators are provided on the bottom of this form for your review



Please check appropriate box(s):

[ x ] Acute Respiratory Failure:                     [ x ] with Hypoxia[  ] 
with Hypercapnia

[  ] Acute On Chronic Respiratory Failure:  [  ] with Hypoxia [  ] with 
Hypercapnia

[  ] Acute Respiratory Failure due to: (etiology) ______________________ 

[  ] Chronic Respiratory Failure only        [  ] with Hypoxia       [  ] with 
Hypercapnia

[  ] Other diagnosis ___________

[  ] Unable to determine



For continuity of documentation, please document condition throughout progress 
notes and discharge summary.  Thank You.





CLINICAL INDICATORS - SIGNS / SYMPTOMS / LABS

- Resp failure hypoxic- O2 supplementation, adequate saturation w 4L arrived in 
the 80s-H&P, 04/23, Pawel Glez

- Dyspnea, Hypoxemia- ED record, 04/23, Hansel Griffin MD

- O2 sat : 91L on 04/26, 93L on 04/28, 92L on 04/29- Vital signs

- RR: 24H on 04/25, 22H on 04/26, 24H on 04/27- Vital signs

- pCO2: 29.7L on 04/23, pO2:57H on 04/23- -H&P, 04/23, Pawel Glez

- Resp failure-Asymptomatic now- Hospital PN, 04/24, Antonio Man MD



RISK FACTORS

-[id number]--T&P, 04/23, Pawel Glez

-Pneumonia- Hospital PN, 04/23, Antonio Man MD



TREATMENTS:

- Nasal Cannula- 04/24

- Supplemental oxygen-Hospital PN, 04/23, Antonio Man MD



(This form is maintained as a part of the permanent medical record)

2015 [employer]  All Rights Reserved

Lory [email or web address]    [phone]

                                                              



MTDMEE

## 2020-05-25 NOTE — RAD
EXAM:

CHEST ONE VIEW PORTABLE:

 

History: 

Status post central line placement. 

 

FINDINGS: 

Right subclavian catheter in place with the tip in the superior vena cava. No pneumothorax or pleural
 effusion or other acute process. 

 

IMPRESSION: 

Unremarkable chest status post right central line placement. 

 

POS: [facility]

## 2020-05-25 NOTE — RAD
CHEST 1 VIEW:

 

HISTORY: 

Fever.  Cough.  COVID positive 2 months ago.

 

COMPARISON: 

4/30/2020.

 

FINDINGS: 

Left ICD.  Heart size is within upper range of normal limits.  There are some patchy increased linear
 and interstitial markings bilaterally which are improved when compared to the prior study.  No signi
ficant new confluent pneumonia or overt edema or pleural effusion.

 

IMPRESSION: 

Increased markings bilaterally actually improved from the prior 4/23/2020 study.  No significant new 
process.

 

POS: [facility]

## 2020-05-25 NOTE — CON
DATE OF CONSULTATION:  05/25/2020



70 minutes of time, of that time greater than 50% was spent with the patient and/or

the patient's unit in the hospital. 



REASON FOR CONSULTATION:  Digoxin toxicity.



HISTORY OF PRESENT ILLNESS:  This is a 79-year-old male who presented to the

hospital earlier today from a rehabilitative facility.  He had recently been

admitted to this facility with COVID-19 symptoms got over that and gone to rehab.

Today, he was complaining of some shortness of breath and weakness.  He was

originally thrown back in COVID-19 isolation, but Dr. Velásquez said that was not

needed. 



He was noted to have potassium level of 5.9 and a digoxin level about 4.  It was

impossible to fully evaluate his underlying cardiac rhythm because he is chronically

paced.  Therefore, he has been given Digibind down in the ER. 



PAST MEDICAL HISTORY:  Stroke, coronary artery disease, hypertension, diabetes

mellitus, and recent COVID-19 pneumonia. 



PAST SURGICAL HISTORY:  Unremarkable except for a pacemaker.



SOCIAL HISTORY:  Does not smoke.  Does not consume alcohol.  Does not use illicit

drugs. 



ALLERGIES:  NAPROSYN.



MEDICATIONS:  Prior to admission; 

1. Januvia.

2. Metformin.

3. Warfarin.

4. Potassium chloride.

5. Omeprazole.

6. Nitroglycerin.

7. Magnesium.

8. Lisinopril.

9. Lasix.

10. Iron sulfate.

11. Digoxin.

12. Coreg.

13. Atorvastatin.

14. Aspirin.

15. Allopurinol.



REVIEW OF SYSTEMS:  Otherwise, negative.



PHYSICAL EXAMINATION:

VITAL SIGNS:  Temperature 96.8, pulse 60, blood pressure 115/53, and O2 saturation

100%. 

HEENT:  Unremarkable. 

NECK:  No adenopathy or JVD. 

LUNGS:  Clear. 

ABDOMEN:  Soft and nontender. 

EXTREMITIES:  No clubbing, cyanosis, or edema.  Pacemaker palpable in left upper

quadrant of chest. 



LABORATORY DATA:  Sodium 143, potassium 5.9, chloride 115, CO2 of 17, ,

creatinine 3.9, and glucose 100.  Troponin 0.129.  White blood cell count 13.5,

hematocrit 41.1, and platelet count 176.  Digoxin level was 4.25. 



ASSESSMENT:  

1. Elevated digoxin level with hyperkalemia, chronic renal failure, and underlying

pacemaker. 

2. Recent COVID-19 pneumonia.



PLAN:  Under evidence-based guidelines on up to date, they recommend treating with

Digibind for hyperkalemia associated with elevated digoxin level above 4 and a

pacemaker in place.  Digibind was given in the emergency room.  The hyperkalemia 

should not be treated at the current time, but repeat potassium level should be done

tonight.  Review of literature states that the potassium level should come down with 

treatment of digoxin toxicity.  Dr. Scherer has seen the patient before.  I will notify

on the patient's admission. 







Job ID:  [id number]

## 2020-05-26 NOTE — CON
DATE OF CONSULTATION:  05/26/2020



REASON FOR CONSULTATION:  Digoxin toxicity.



PRIMARY CARDIOLOGIST:  Tadeo Anderson MD 



Cardiologist who has seen him here is Dr. Jeremiah Alvarado.



HISTORY OF PRESENT ILLNESS:  Mr. Stoner is a 79-year-old gentleman.  He recently was

here in the hospital.  He was seen by Dr. Alvarado.  It was noted that he was

diagnosed with COVID recently.  He had left-sided chest pain, thought to be atypical

for angina.  The patient has a history of coronary disease and defibrillator

implantation and chronic atrial fibrillation. 



The patient was admitted with weakness and fatigue in this admission, found to be

digoxin toxic.  He did receive 2 units of Digibind yesterday. 



The patient was also seen here by Dr. Archuleta.  It was noted that the patient had

nonsustained ventricular tachycardia.  He had a defibrillator, it was interrogated,

it is below the detect rate.  The patient was on Cardizem, digoxin, and Coreg. 



Echocardiogram was done in 2017 with an ejection fraction of 30%.  The patient is

somewhat confused now and does not have accurate review of systems. 



PHYSICAL EXAMINATION:

GENERAL:  He is an underweight 79-year-old gentleman, who said he is thirsty. 

VITAL SIGNS:  Blood pressure 91/43, pulse 60. 

LUNGS:  Clear. 

CARDIAC:  Normal S1, normal S2.  There is no murmur, rub, or gallop. 

ABDOMEN:  Soft, nontender. 

EXTREMITIES:  Warm, dry.  No clubbing or cyanosis.  There is no edema.



LABORATORY STUDIES:  EKG shows a very wide paced ventricular rhythm. 



Left bundle-branch block pattern. 



The chest x-ray reveals single-chamber defibrillator with cardiomegaly.



ASSESSMENT:  

1. Chronic atrial fibrillation.

2. Digoxin toxicity with a digoxin level of 4.25.

3. Renal failure, improving.  Creatinine down to 2.96.  Creatinine had been up to

3.98. 

4. Previously documented left ventricular dysfunction.



PLAN:  

1. Would obviously stop digoxin.

2. Would hold Coreg for now to see if he has an intrinsic rhythm that goes above the

paced rhythm as it is a very wide-complex rhythm.  It is probably much less

efficient. 

3. Echocardiogram this admission.

4. Would increase intravenous fluid to 125 mL per hour.

5. Dr. Alvarado to resume care tomorrow.







Job ID:  [id number]

## 2020-05-26 NOTE — HP
CHIEF COMPLAINT:  Failure to thrive.



HISTORY OF PRESENT ILLNESS:  The patient is a 79-year-old male with past medical

history of coronary artery disease, CVA, hypertension, diabetes mellitus, atrial

fibrillation, prior ICD placement, and recent hospitalization for COVID-19, and 
he

was subsequently sent to the rehab and discharged home thereafter.  He was 
brought

to the ER today by his family due to complaints of generalized weakness, 
decreased

appetite, and shortness of breath.  In the ER, the patient was found to be

clinically dehydrated with elevated creatinine level and elevated serum digoxin

level. 



REVIEW OF SYSTEMS:  Negative except as noted above.



PAST MEDICAL HISTORY:  As noted above.



PAST SURGICAL HISTORY:  This includes ICD placement, and no other history.



SOCIAL HISTORY:  The patient lives with the family.  Denies alcohol use, illicit

drug use, or smoking. 



PHYSICAL EXAMINATION:

GENERAL:  The patient is alert and oriented. 

HEENT:  Head is normocephalic and atraumatic.  Extraocular muscles are intact.

Pupils are equal and reactive. 

NECK:  Supple. 

HEART:  Ausculation showed regular rate and rhythm. 

CHEST:  Clear to auscultation bilaterally. 

ABDOMEN:  Soft, nontender, nondistended. 

NEUROLOGICAL:  Normal cranial nerves 2 through 12 and no focal deficits.



DIAGNOSTIC DATA:  EKG revealed presence of sinus rhythm.



LABORATORY FINDINGS:  Hyperkalemia with potassium level of 5.9 and creatinine 
level

elevated at 3.9 8 in addition to slightly elevated troponin level of 0.17. 



ASSESSMENT:  

1. Digoxin toxicity.

2. Acute renal failure.

3. Hyperkalemia.

4. Dehydration.

5. Coronary artery disease.

6. Atrial fibrillation, on warfarin.

7. The patient met criteria for administration of anti-digoxin antibodies given 
his

acute renal failure and elevated potassium level, it is greater than 5.5. 
Arrhythmias

could not be adequately assessed due to the presence of a pacemaker. 

8. The patient received a full-dose of anti-digoxin antibody.  Subsequently, his

potassium level improved to 4.6.  We will continue to hydrate the patient 
overnight.

 Lasix and lisinopril have been placed on hold due to acute kidney injury.  The

patient's BNP is 78, and no signs of volume overload at present. 







Job ID:  [id number]



[facility]

## 2020-05-26 NOTE — PRG
DATE OF SERVICE:  05/26/2020



SUBJECTIVE:  Norris Stoner is a Telugu-speaking gentleman.  This morning, he is

awake, alert, and responsive.  No pain.  No discomfort.  No shortness of breath.  He

is brought in because of unusual cardiac rhythm from a rehab facility. 



OBJECTIVE:  VITAL SIGNS:  Saturations are 100% on room air, blood pressure 115/48,

pulse 70, respirations 18. 

CHEST:  No wheezing or crackles. 

CARDIAC:  Normal S1 and S2.  No gallops. 

ABDOMEN:  Soft.



LABORATORY DATA:  Creatinine is 2.96, BUN is 100, sodium 151.  White count 11,000.



IMPRESSION:  Azotemia, probably prerenal, unknown cardiac arrhythmias.



PLAN:  He can be transferred out of the ICU at this stage.  Continue slow hydration. 



Input from Cardiology.  We will follow in the ICU.







Job ID:  [id number]

## 2020-05-26 NOTE — PDOC.HOSPP
- Subjective


Encounter Date: 05/26/20


Subjective: 


Confused





- Objective


Vital Signs & Weight: 


 Vital Signs (12 hours)











  Temp Pulse Ox


 


 05/26/20 15:00  96.9 F L 


 


 05/26/20 12:00  97.6 F 


 


 05/26/20 07:52   100








 Weight











Admit Weight                   125 lb


 


Weight                         125 lb 0.034 oz











 Most Recent Monitor Data











Heart Rate from ECG            62


 


NIBP                           129/60


 


NIBP BP-Mean                   83


 


Respiration from ECG           15


 


SpO2                           100














I&O: 


 











 05/25/20 05/26/20 05/27/20





 06:59 06:59 06:59


 


Intake Total  1225 


 


Output Total  1300 285


 


Balance  -75 -285











Result Diagrams: 


 05/26/20 03:17





 05/26/20 03:17





Hospitalist ROS





- Medication


Medications: 


Active Medications











Generic Name Dose Route Start Last Admin





  Trade Name Freq  PRN Reason Stop Dose Admin


 


Aspirin  81 mg  05/26/20 09:00  05/26/20 09:17





  Ecotrin  PO   81 mg





  DAILY JEREL   Administration





     





     





     





     


 


Atorvastatin Calcium  40 mg  05/26/20 09:00  05/26/20 09:17





  Lipitor  PO   40 mg





  DAILY JEREL   Administration





     





     





     





     


 


Warfarin Sodium  5 mg  05/26/20 17:00  05/26/20 17:47





  Coumadin  PO   5 mg





  1700 JEREL   Administration





     





     





     





     














- Exam


General Appearance: awake alert


ENT: normocephalic atraumatic


Neck: supple


Heart: RRR


Respiratory: normal chest expansion, no tachypnea


Gastrointestinal: soft


Neurological: cranial nerve grossly intact





Hosp A/P


(1) Digoxin toxicity


Code(s): T46.0X1A - POISONING BY CARDI-STIM GLYCOS/DRUG SIMLAR ACT, ACC, INIT   

Status: Acute   





(2) JOE (acute kidney injury)


Code(s): N17.9 - ACUTE KIDNEY FAILURE, UNSPECIFIED   Status: Acute   





(3) Hyperkalemia


Code(s): E87.5 - HYPERKALEMIA   Status: Acute   





- Plan





Digoxin toxicity status post anti-reduction antibody administration yesterday.


His rhythm is paced with frequent PVCs.


Restart carvedilol.


Hyper kalemia and acute kidney injury improving with IV fluids.


Half-normal saline is being used due to hypernatremia.

## 2020-05-27 NOTE — PDOC.HOSPP
- Subjective


Encounter Date: 05/27/20


Subjective: 


No new complains





- Objective


Vital Signs & Weight: 


 Vital Signs (12 hours)











  Temp Pulse Pulse BP BP


 


 05/27/20 19:19  97.7 F    


 


 05/27/20 15:28  96.0 F L    


 


 05/27/20 12:15  96.3 F L    


 


 05/27/20 09:53   60  62  124/55 L  120/96 H








 Weight











Admit Weight                   125 lb


 


Weight                         131 lb 6.328 oz











 Most Recent Monitor Data











Heart Rate from ECG            67


 


NIBP                           130/60


 


NIBP BP-Mean                   83


 


Respiration from ECG           16


 


SpO2                           99














I&O: 


 











 05/26/20 05/27/20 05/28/20





 06:59 06:59 06:59


 


Intake Total 1225 1370 


 


Output Total 1300 635 200


 


Balance -75 735 -200











Result Diagrams: 


 05/27/20 04:20





 05/27/20 04:20





Hospitalist ROS





- Medication


Medications: 


Active Medications











Generic Name Dose Route Start Last Admin





  Trade Name Freq  PRN Reason Stop Dose Admin


 


Aspirin  81 mg  05/26/20 09:00  05/27/20 08:17





  Ecotrin  PO   81 mg





  DAILY JEREL   Administration





     





     





     





     


 


Atorvastatin Calcium  40 mg  05/26/20 09:00  05/27/20 08:17





  Lipitor  PO   40 mg





  DAILY JEREL   Administration





     





     





     





     


 


Sodium Chloride  10 ml  05/26/20 21:00  05/27/20 08:17





  Flush - Normal Saline  IVF   10 ml





  Q12HR JEREL   Administration





     





     





     





     


 


Warfarin Sodium  5 mg  05/26/20 17:00  05/27/20 18:04





  Coumadin  PO   5 mg





  1700 JEREL   Administration





     





     





     





     














- Exam


General Appearance: awake alert


ENT: normocephalic atraumatic


Neck: supple


Heart: RRR, no murmur, no gallops, no rubs, normal peripheral pulses


Respiratory: CTAB, normal chest expansion, no tachypnea


Gastrointestinal: soft


Neurological: cranial nerve grossly intact, no focal deficits





Hosp A/P


(1) Digoxin toxicity


Code(s): T46.0X1A - POISONING BY CARDI-STIM GLYCOS/DRUG SIMLAR ACT, ACC, INIT   

Status: Acute   





(2) JOE (acute kidney injury)


Code(s): N17.9 - ACUTE KIDNEY FAILURE, UNSPECIFIED   Status: Acute   





(3) Hyperkalemia


Code(s): E87.5 - HYPERKALEMIA   Status: Acute   





(4) Hypernatremia


Code(s): E87.0 - HYPEROSMOLALITY AND HYPERNATREMIA   Status: Acute   





- Plan





Digoxin toxicity status post anti-reduction antibody administration yesterday.


His rhythm is paced with frequent PVCs.


Continue carvedilol.


Hypernatremia is persistent.  Change IV fluids to D5W with half-normal saline.  

Check BMP in the a.m.


Hyperkalemia and acute kidney injury improving with IV fluids.

## 2020-05-27 NOTE — PRG
DATE OF SERVICE:  



SUBJECTIVE:  Norris Stoner is a 79-year-old gentleman.  This morning, he is better.



OBJECTIVE:  VITAL SIGNS:  His blood pressure is much improved 124/49, pulse 74,

temperature 97, sats 100%, and respiratory rate 18. 

CHEST:  Decreased breath sounds, no wheezing. 

CARDIAC:  Normal S1 and S2.  No gallops. 

ABDOMEN:  No masses.



LABORATORY DATA:  Creatinine is 2; BUN is 75, much improved; white count 10,000.



IMPRESSION:  Prerenal azotemia and diabetes.



PLAN:  He can probably be transferred out of the MICU to non-monitored bed.

Continue PT, eventually placement. 







Job ID:  [id number]

## 2020-05-28 NOTE — PRG
DATE OF SERVICE:  05/28/2020



SUBJECTIVE:  This morning, he is in the MICU.  Repeat coronavirus serology was

positive.  It is still probably __________. 



OBJECTIVE:  VITAL SIGNS:  Temperature 97, blood pressure 134/62, and sats 100%. 

CHEST:  Decreased breath sounds.  No wheezing. 

CARDIAC:  Normal S1 and S2.  No gallops. 

ABDOMEN:  No masses.



LABORATORY DATA:  Unremarkable.  His renal function is improved.  BUN is 56,

creatinine 1.6. 



ASSESSMENT AND PLAN:  Renal failure acute on chronic, nausea and vomiting,

apparently digoxin toxicity.  Pulmonary wise, he is stable enough to transfer out of

the MICU, back to [place] where he came from. 







Job ID:  [id number]

## 2020-05-28 NOTE — PQF
CLINICAL DOCUMENTATION IMPROVEMENT CLARIFICATION FORM:  ICD-10 Updated

PLEASE DO AN ADDENDUM TO THE PROGRESS NOTE WITH ANY DOCUMENTATION UPDATES OR 
ADDITIONS AND CARRY THROUGH TO DC SUMMARY.   THANK YOU.



Date:    5/28/2020                                 ATTN:  Dr. Alaniz



Please exercise your independent, professional judgment in responding to the 
clarification form. 

Clinical indicators are provided on the bottom of this form for your review



Please check appropriate box(s):

[ > ] Protein Calorie Malnutrition:    [  ] Mild      [  ] Moderate   [  >] 
Severe   

[  ] Other Malnutrition (please specify) ________________________

[  ] Underweight without malnutrition

[  ] Cachexia

[  ] Other diagnosis ___________

[  ] Unable to determine



In addition, please specify:

Present on Admission (POA):  [>  ] Yes             [  ] No             [  ] 
Unable to determine



CLINICAL INDICATORS - SIGNS / SYMPTOMS / LABS    / RESULTS AND LOCATION IN MR



H&P 5/25: Chief Complaint: Failure to thrive.

                                         He was brought to the ER by his family 
d/t complaints

                                         of generalized weakness, decreased 
appetite, and SOB.



Dietician Assessment 5/26:  BMI 16.9

            Nutrition Diagnosis:  Malnutrition Related To recent Covid

                 As evidence by:  moderate to severe muscle wasting observed 
with fat

                                          wasting to the leg, 10% wt loss over 
1 month with pt taking

                                           < 25% of meals suggestive of severe 
malnutrition in the

                                           context of an acute illness.



RISKS:

H&P 5/25: 80 yo with hx CAD, CVA, HRN, DM, Atrial fibrillation, and recent 
hospitalization

for COVID-19. Assessment: Digoxin toxicity. Acute Renal Failure.



TREATMENT:

Order 5/25:  Dietician Consult for Malnutrition.

Order 5/26: Diet Supplement  Suplena  TID

Order 5/27: Supplement: Mighty Shake TID with meals

______________________________________________________



Moderate Malnutrition (in acute illness)

   Energy Intake: <75% of estimated energy requirement for > 7 days

   Weight Loss:  1-2%/1 week;  5%/ 1 month; 7.5%/3 months

   Other: mild body fat loss; mild muscle mass loss; mild fluid accumulation; 

Severe Malnutrition (in acute illness)

   Energy Intake: < 50% of estimated energy requirement for > 5 days

   Weight Loss: >1-2%/1 week; >5%/1 month; >7.5%/3 months

   Other: moderate body fat loss; moderate muscle mass loss; moderate- severe 
fluid accumulation; measurably reduced  strength

Moderate Malnutrition (in chronic illness)

   Energy Intake: <75% of estimated energy requirement for >1 month

   Weight Loss: 5%/1 month; 7.5%/3 months; 10%/6 months; 20%/1 year

   Other: mild body fat loss; mild muscle mass loss; mild fluid accumulation

Severe Malnutrition (in chronic illness)

   Energy Intake: <75% of estimated energy requirement for >1 month

   Weight Loss: >5%/1 month; >7.5%/3 months; >10%/6 months; >20%/1 year

   Other: severe body fat loss; severe muscle mass loss; severe fluid 
accumulation; measurably reduced  strength

_________________________________________________________________





Thank you,

Karen

(This form is maintained as a part of the permanent medical record)

 2015 [employer]  All Rights Reserved

Karen Varela RN, BSN    [email or web address]    Cell Phone: [phone]

                                                              

 

[facility]

## 2020-05-29 NOTE — DIS
DATE OF ADMISSION:  05/25/2020



DATE OF DISCHARGE:  05/28/2020



DISCHARGE DIAGNOSES:  

1. Digoxin toxicity.

2. Acute kidney injury.

3. Hyperkalemia.

4. Hypernatremia.



DISCHARGE MEDICATIONS:  

1. Aspirin 81 mg orally daily.

2. Atorvastatin 40 mg orally nightly.

3. Warfarin 5 mg orally daily.

4. Allopurinol 300 mg orally daily.

5. Carvedilol 25 mg orally twice daily.

6. Ferrous sulfate 325 mg orally daily.

7. Lasix 20 mg orally daily.

8. Lisinopril 10 mg orally daily.

9. Magnesium oxide 400 mg orally twice daily.

10. Metformin 1000 mg orally twice daily.

11. Nitroglycerin 0.4 mg sublingual q.5 minutes as needed for chest pain.

12. Omeprazole 20 mg orally daily.

13. Januvia 100 mg orally daily.



DISCONTINUED MEDICATION:  Digoxin.



HISTORY OF PRESENT ILLNESS AND HOSPITAL COURSE:  The patient is a 79-year-old 
male

with past medical history of coronary artery disease, CVA, hypertension, 
diabetes

mellitus, atrial fibrillation, congestive heart failure with ICD placement, and

recent hospitalization for COVID-19, who was discharged home and thereafter was

brought back to the hospital for generalized weakness.  The patient was found 
to be

in acute kidney injury and his digoxin level was elevated.  He was admitted to 
the

hospital with impression of digoxin toxicity.  He was given anti-digoxin 
antibodies

due to elevated digoxin level in addition to acute kidney injury and 
hyperkalemia.

The patient was also managed with IV hydration.  Those measures have led to

improvement in his condition.  No arrhythmias were noted, likely due to 
presence of

the pacemaker masking bradycardia.  Digoxin was discontinued.  Acute kidney

injury continued to improve throughout his hospital stay.  Lisinopril and Lasix 
were

placed on hold during his hospitalization and will be restarted after 
discharge.  We

will arrange for home health to assist with medications and with physical 
therapy. 







Job ID:  [id number]



[facility]

## 2020-05-31 NOTE — PQF
CLARISSALUIS SANTIAGO                                            EDIS GALLEGOS

[id number]                                                             [facility]-
B09

[id number]                             

                                   

CLINICAL DOCUMENTATION CLARIFICATION FORM:  POST DISCHARGE



Addendum to original discharge summary date:  __________________________________
____



Late entry note date:  _________________________________________________________
__











DATE:   05/31/2020                                         ATTN: EDIS GALLEGOS



Please exercise your independent, professional judgment in responding to the 
clarification form. 

Clinical indicators are provided on the bottom of this form for your review



Please check appropriate box(s):



[  ] Poisoning of Digoxin toxicity

[ > ] Adverse effect of Digoxin toxicity

[  ] Other diagnosis ___________

[  ] Unable to determine



For continuity of documentation, please document condition throughout progress 
notes and discharge summary.  Thank You.





CLINICAL INDICATORS - SIGNS / SYMPTOMS / LABS

- Digoxin Toxicity- DS, 05/28, EDIS GALLEGOS

- Found to be in acute kidney injury and his digoxin toxicity- DS, 05/28, 
EDIS GALLEGOS

- Digibind for hyperkalemia associated with elevated digoxin level above 4 and 
a pacemaker in place-Consultation report, 5/25, Neftaly Mosley MD

- Digoxin toxicity with hypotension-ED record, 05/25, Ayesha Post MD

- The patient was on Cardizem, Digoxin, Coreg- Consultation report, 05/26, 
Xi Guzman MD



RISKS:

-Chronic atrial fibrillation- Consultation report, 05/26, Xi Guzman MD

-Acute kidney injury- DS, 05/28, EDIS GALLEGOS

-Hyperkalemia- DS, 05/28, EDIS GALLEGOS



TREATMENT:

- Digibind was given- ED record, 05/25, Ayesha Post MD

- Anti-digoxin antibodies-H&P, 05/25,   EDIS GALLEGOS

- Sodium chloride.IV-MAR, 05/25







(This form is maintained as a part of the permanent medical record)

2015 [employer]  All Rights Reserved

Lory [email or web address]    [phone]



ALEJANDRA